# Patient Record
Sex: FEMALE | Race: WHITE | NOT HISPANIC OR LATINO | Employment: UNEMPLOYED | URBAN - METROPOLITAN AREA
[De-identification: names, ages, dates, MRNs, and addresses within clinical notes are randomized per-mention and may not be internally consistent; named-entity substitution may affect disease eponyms.]

---

## 2017-07-06 ENCOUNTER — TRANSFERRED RECORDS (OUTPATIENT)
Dept: HEALTH INFORMATION MANAGEMENT | Facility: CLINIC | Age: 32
End: 2017-07-06

## 2017-08-18 ENCOUNTER — MEDICAL CORRESPONDENCE (OUTPATIENT)
Dept: BEHAVIORAL HEALTH | Facility: CLINIC | Age: 32
End: 2017-08-18

## 2017-08-21 ENCOUNTER — HOSPITAL ENCOUNTER (OUTPATIENT)
Dept: BEHAVIORAL HEALTH | Facility: CLINIC | Age: 32
End: 2017-08-21
Attending: PSYCHIATRY & NEUROLOGY
Payer: MEDICAID

## 2017-08-21 VITALS
BODY MASS INDEX: 28.1 KG/M2 | WEIGHT: 158.6 LBS | DIASTOLIC BLOOD PRESSURE: 77 MMHG | HEART RATE: 100 BPM | SYSTOLIC BLOOD PRESSURE: 109 MMHG | HEIGHT: 63 IN | TEMPERATURE: 98 F

## 2017-08-21 PROCEDURE — H2035 A/D TX PROGRAM, PER HOUR: HCPCS | Mod: HQ

## 2017-08-21 PROCEDURE — 10020000 ZZH LODGING PLUS FACILITY CHARGE ADULT

## 2017-08-21 RX ORDER — LORATADINE 10 MG/1
10 TABLET ORAL DAILY PRN
COMMUNITY
End: 2017-09-16

## 2017-08-21 RX ORDER — MAGNESIUM HYDROXIDE/ALUMINUM HYDROXICE/SIMETHICONE 120; 1200; 1200 MG/30ML; MG/30ML; MG/30ML
30 SUSPENSION ORAL EVERY 6 HOURS PRN
COMMUNITY
End: 2017-09-16

## 2017-08-21 RX ORDER — AMOXICILLIN 250 MG
2 CAPSULE ORAL DAILY PRN
COMMUNITY
End: 2017-09-16

## 2017-08-21 ASSESSMENT — ANXIETY QUESTIONNAIRES
GAD7 TOTAL SCORE: 1
4. TROUBLE RELAXING: NOT AT ALL
5. BEING SO RESTLESS THAT IT IS HARD TO SIT STILL: NOT AT ALL
1. FEELING NERVOUS, ANXIOUS, OR ON EDGE: SEVERAL DAYS
6. BECOMING EASILY ANNOYED OR IRRITABLE: NOT AT ALL
2. NOT BEING ABLE TO STOP OR CONTROL WORRYING: NOT AT ALL
3. WORRYING TOO MUCH ABOUT DIFFERENT THINGS: NOT AT ALL
7. FEELING AFRAID AS IF SOMETHING AWFUL MIGHT HAPPEN: NOT AT ALL

## 2017-08-21 ASSESSMENT — PATIENT HEALTH QUESTIONNAIRE - PHQ9: SUM OF ALL RESPONSES TO PHQ QUESTIONS 1-9: 2

## 2017-08-21 ASSESSMENT — PAIN SCALES - GENERAL: PAINLEVEL: NO PAIN (0)

## 2017-08-21 NOTE — PROGRESS NOTES
08/21/17  Pt entered the [facility] unit this date.  She attended the pm group, introduced herself to peers and became active in the group process.  Pt was provided basic materials to include a treatment goal list and a Patient Safety Plan.  Pt appears open and willing for treatment at this time.  Pt to attend the LP orientation and continue program.
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
## 2017-08-21 NOTE — PROGRESS NOTES
Initial Services Plan        Before your first treatment group, please do the following    Immediate health & safety concerns: Go to the emergency room if you start to have withdrawal symptoms.  Look for sober housing and a supportive social network.  Look for a support network (such as AA, NA, DBT group, a Cheondoism group, etc.)    Suggestions for client during the time between intake & completion of treatment plan:  Tour your treatment center (unit or outpatient clinic).  Introduce yourself to the treatment group.  Spend time getting to know your peers.  Review your patient or client handbook.    Client issues to be addressed in the first treatment sessions:  Other Share with the group what motivated her for treatment i.e. CPS.       ANGEL Patel  8/21/2017  7:19 AM

## 2017-08-21 NOTE — PROGRESS NOTES
Name: Jayna oFx  Date: 8/21/2017  Medical Record: [id number]    Envelope Number: [id number]    List of Contents (List each item separately in new row):   Eye Drops ([employer])    Admission:  I am responsible for any personal items that are not sent to the safe or pharmacy.  [facility] is not responsible for loss, theft or damage of any property in my possession.      Patient Signature:  ___________________________________________       Date/Time:__________________________    Staff Signature: __________________________________       Date/Time:__________________________    2nd Staff person, if patient is unable/unwilling to sign:      __________________________________________________________       Date/Time: __________________________      Discharge:  [facility] has returned all of my personal belongings:    Patient Signature: ________________________________________     Date/Time: ____________________________________    Staff Signature: ______________________________________     Date/Time:_____________________________________

## 2017-08-21 NOTE — PROGRESS NOTES
"LP Admission Update    Date of update:     8/21/2017 Update Evaluation Counselor:      Franklin Paul [facility]     Date of original Rule 25:    7/13/2917    Original Evaluation Counselor:      [place] [phone]       PHQ-9  Score 10 or greater No  2   ABUNDIO-7  Score 10 or greater No  1   If the patient responded yes to any of the above questions except for *, then the patient would need to be scheduled for an initial mental health screen.    Suicide Screening Questions:     1. Are you feeling hopeless about the present/future? No   2. Have you ever had thoughts about taking your life? No   3. When did you have these thoughts? NA   4. Do you have any current intent or active desire to take your life? No   5. Do you have a plan to take your life?  No   6. Have you ever made a suicide attempt? No   7. Do you have access to pills, guns or other methods to kill yourself? No                  Risk Status - Use as Guide/Example     Ideation - Active  Thoughts of suicide Intent to follow  Through on suicide Plan for completing  suicide    Yes No Yes No Yes No   Emergent X   X   X     Urgent / Non-Emergent X   X     X   Non-Urgent X     X   X   No Current / Active Risk (Past 6 Months)   X   X   X   Jayna Fox No No No         Additional Risk Factors: Significant history of having untreated or poorly treated mental health symptoms  She is required to go into tx by CPS with the threat of dtr. Being taken away.    Protective Factors:  Her father is supportive.      Risk Status:     Emergent? No  Urgent / Non-Emergent?  No  Present / Non- Urgent? Yes, Document in Epic / [employer] to counselor, Collaborate with patient / client to develop \"Patient Safety Plan\", Address in Treatment Plan, Continuous monitoring, assessment and intervention and Address in Discharge / Transition Plan      No Current Risk? See above     Additional information to support suicide risk rating: See Above        Current EDIN:       .000   Current UA: "   Positive for amph and met and negative for all other screened drugs.     Alcohol/Drug use since last CD evaluation (include date and time of last use):   8/18/17 pt smoked unknown amt of crack at 9 pm     Please note any other clinical changes since the last CD evaluation (such as medication changes, additional legal charges, detoxification admissions, overdoses, etc):    No significant changes since the last CD evaluation       Current ASAM Dimensions      Intoxication and Withdrawal: 0  Biomedical:  0  Emotional and Behavioral:  2  Readiness to Change:  2  Relapse Potential: 4  Recovery Environmental:  3

## 2017-08-21 NOTE — PROGRESS NOTES
[facility] Nursing Health Assessment    Vital signs: BP - 109/77   Pulse - 100    Temp - 98    Direct admission    Counselor: Ethel Dunn  Drug of Choice: Methamphetamine   Last use: 8/18/2017  Home clinic/MD: [facility]  Patient reports having an H & P evaluation in the ED at [facility] about a month ago after Left ankle sprain. No H & P needed at this time.     Psychiatrist/therapist: None  Per patient she is court ordered to have a psych assessment during treatment. Appointment request made for patient to see Dr. Bar.     Medical history/current conditions: None    Mental Health diagnosis: None  Medication compliant?: N/A no medications  Recent sucidal thoughts? none    When? N/A  Current thought of self-harm? none    Plan? N/A  Pt. Self rating of impulsiveness? (1-10 scale): 4    Pain assessment:   Pt. Experiencing pain at this time? No  Rating on 0-10 scale: (1-10 scale): 0  Location: None  Result of: N/A  L P pain management strategy: OTC medications    On-going nursing intervention required?   No
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
## 2017-08-21 NOTE — PROGRESS NOTES
Name: Jayna Fox  Date: 8/21/2017  Medical Record: [id number]    Envelope Number: [id number]    List of Contents (List each item separately in new row):   Cellphone    Admission:  I am responsible for any personal items that are not sent to the safe or pharmacy.  [facility] is not responsible for loss, theft or damage of any property in my possession.      Patient Signature:  ___________________________________________       Date/Time:__________________________    Staff Signature: __________________________________       Date/Time:__________________________    2nd Staff person, if patient is unable/unwilling to sign:      __________________________________________________________       Date/Time: __________________________      Discharge:  [facility] has returned all of my personal belongings:    Patient Signature: ________________________________________     Date/Time: ____________________________________    Staff Signature: ______________________________________     Date/Time:_____________________________________

## 2017-08-22 ENCOUNTER — HOSPITAL ENCOUNTER (OUTPATIENT)
Dept: BEHAVIORAL HEALTH | Facility: CLINIC | Age: 32
End: 2017-08-22
Attending: FAMILY MEDICINE
Payer: MEDICAID

## 2017-08-22 PROCEDURE — 10020000 ZZH LODGING PLUS FACILITY CHARGE ADULT

## 2017-08-22 PROCEDURE — H2035 A/D TX PROGRAM, PER HOUR: HCPCS | Mod: HQ

## 2017-08-22 ASSESSMENT — ANXIETY QUESTIONNAIRES: GAD7 TOTAL SCORE: 1
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
## 2017-08-22 NOTE — PROGRESS NOTES
[address] [address]          Maliha Tejada  [facility]   Work cell: [phone]  Direct Line: [phone]  Fax [phone]    Dear Maliha,    This is to verify that Jayna Fox was admitted for treatment of chemical dependency at [place], [place] on 8/21/2017.    This individual is currently participating in:   ______ Inpatient   __x____ Lodging Plus (Residential Non-Medical)   ______ Day Outpatient   ______ Evening Outpatient       The client will participate in the program from three to four weeks, depending on the needs of the client.  Treatment is A.A. based and helps clients to achieve a chemically free lifestyle through lectures, individual, family and group therapy.  She has been assigned to Mehdi Telephone: [phone]  as her primary counselors.  If you have further questions, please contact us.    Respectfully,    ANGEL Patel [facility], [facility]  Licensed Psychotherapist Supervisor  [address] [place]. [address]  [email or web address]  Tel. [phone]  Fax. [phone]    Faxed 8/21/17

## 2017-08-22 NOTE — PROGRESS NOTES
[address] [address]          Girma Sandra  [facility]   Fax  [phone]    Dear Girma,    This is to verify that Jayna Fox was admitted for treatment of chemical dependency at [place], [place] on 8/21/2017.    This individual is currently participating in:   ______ Inpatient   __x____ Lodging Plus (Residential Non-Medical)   ______ Day Outpatient   ______ Evening Outpatient       The client will participate in the program from three to four weeks, depending on the needs of the client.  Treatment is A.A. based and helps clients to achieve a chemically free lifestyle through lectures, individual, family and group therapy.  She has been assigned to Mehdi  Telephone: [phone] as her primary counselor.  If you have further questions, please contact us.    Respectfully,    ANGEL Patel [facility], [facility]  Licensed Psychotherapist Supervisor  [address] [place]. [address]  [email or web address]  Tel. [phone]  Fax. [phone]    Fax  8/21/2017
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
## 2017-08-23 ENCOUNTER — HOSPITAL ENCOUNTER (OUTPATIENT)
Dept: BEHAVIORAL HEALTH | Facility: CLINIC | Age: 32
End: 2017-08-23
Attending: FAMILY MEDICINE
Payer: MEDICAID

## 2017-08-23 PROBLEM — F19.20 CHEMICAL DEPENDENCY (H): Status: ACTIVE | Noted: 2017-08-23

## 2017-08-23 PROCEDURE — 10020000 ZZH LODGING PLUS FACILITY CHARGE ADULT

## 2017-08-23 PROCEDURE — H2035 A/D TX PROGRAM, PER HOUR: HCPCS | Mod: HQ

## 2017-08-23 PROCEDURE — H2035 A/D TX PROGRAM, PER HOUR: HCPCS

## 2017-08-23 NOTE — PROGRESS NOTES
Name: Jayna Fox  Date: 8/23/2017  Medical Record: [id number]    Envelope Number: [id number]    List of Contents (List each item separately in new row):   Eye Drops ([employer])    Admission:  I am responsible for any personal items that are not sent to the safe or pharmacy.  [facility] is not responsible for loss, theft or damage of any property in my possession.      Patient Signature:  ___________________________________________       Date/Time:__________________________    Staff Signature: __________________________________       Date/Time:__________________________    2nd Staff person, if patient is unable/unwilling to sign:      __________________________________________________________       Date/Time: __________________________      Discharge:  [facility] has returned all of my personal belongings:    Patient Signature: ________________________________________     Date/Time: ____________________________________    Staff Signature: ______________________________________     Date/Time:_____________________________________

## 2017-08-23 NOTE — PROGRESS NOTES
Name: Jayna Fox  Date: 8/23/2017  Medical Record: [id number]    Envelope Number: [id number]    List of Contents (List each item separately in new row):   Cell Phone ([employer]TE)    Admission:  I am responsible for any personal items that are not sent to the safe or pharmacy.  [facility] is not responsible for loss, theft or damage of any property in my possession.      Patient Signature:  ___________________________________________       Date/Time:__________________________    Staff Signature: __________________________________       Date/Time:__________________________    2nd Staff person, if patient is unable/unwilling to sign:      __________________________________________________________       Date/Time: __________________________      Discharge:  [facility] has returned all of my personal belongings:    Patient Signature: ________________________________________     Date/Time: ____________________________________    Staff Signature: ______________________________________     Date/Time:_____________________________________

## 2017-08-23 NOTE — PROGRESS NOTES
"8/23/17  Pt and counselor met for discussion and development of treatment plan.  Pt reports feeling she does not have an addiction problem.  She reports she entered treatment due to her legal issues. Pt reports \"I am willing to do whatever I need to do to complete the program.\"  Pt had difficulty identifying issues to be addressed in treatment, but agreed to look at abandonment, grief/loss, emotional management and strengthening her sense of self.  Pt to follow treatment plan.  "

## 2017-08-23 NOTE — PROGRESS NOTES
Acknowledgement of Current Treatment Plan     1. I have reviewed my treatment plan with my therapist / counselor on 08/24/17. I agree with the plan as it is written in the electronic health record.    Name Signature   Jayna STACY Fox    Name of Therapist / Counselor    Shaun ORTIZ      2. I have completed and reviewed my Safety Plan with my counselor and signed this on                  . I have been given the hard copy of this plan.    Patient signature:  ________________________________________________________________________    Signatures required for any additional Problems, Goals, and/or Interventions added to treatment plan:    I have been given a copy of the addition to my treatment plan in Dimension _____ on [date           ] and I agree with this as it is written in the electronic record.     Patient signature:   ________________________________________________________________________    I have been given a copy of the addition to my treatment plan in Dimension _____ on [date           ] and I agree with this as it is written in the electronic record.      Patient signature:   ________________________________________________________________________    I have been given a copy of the addition to my treatment plan in Dimension _____ on [date          ] and I agree with this as it is written in the electronic record.     Patient signature:   ________________________________________________________________________    I have been given a copy of the addition to my treatment plan in Dim[facility] _____ on [date         ] and I agree with this as it is written in the electronic record.      Patient signature:   ________________________________________________________________________

## 2017-08-24 ENCOUNTER — HOSPITAL ENCOUNTER (OUTPATIENT)
Dept: BEHAVIORAL HEALTH | Facility: CLINIC | Age: 32
End: 2017-08-24
Attending: FAMILY MEDICINE
Payer: MEDICAID

## 2017-08-24 PROCEDURE — 10020000 ZZH LODGING PLUS FACILITY CHARGE ADULT

## 2017-08-24 PROCEDURE — H2035 A/D TX PROGRAM, PER HOUR: HCPCS | Mod: HQ

## 2017-08-25 ENCOUNTER — HOSPITAL ENCOUNTER (OUTPATIENT)
Dept: BEHAVIORAL HEALTH | Facility: CLINIC | Age: 32
End: 2017-08-25
Attending: FAMILY MEDICINE
Payer: MEDICAID

## 2017-08-25 PROCEDURE — H2035 A/D TX PROGRAM, PER HOUR: HCPCS | Mod: HQ

## 2017-08-25 PROCEDURE — 10020000 ZZH LODGING PLUS FACILITY CHARGE ADULT

## 2017-08-25 NOTE — PROGRESS NOTES
[address] [address]          Jayna Fox, 1985, was admitted for evaluation/treatment of chemical dependency at [facility].  This person took part in these program(s):    ______ The Inpatient Program   ______ The Outpatient Program   __X__ The Lodging Plus Program   ______ Lodging Day Outpatient       Date admitted: 8/21/17  Date discharged: 8/25/2017     Type of discharge:   ______ Satisfactory - completed evaluation / treatment   ______ Discharged without completing   ______ Behavioral discharge   ______ Transferred to another chemical dependency program   ______ Transferred to another type of service   __X___ Left against medical advice (AMA) / [facility]       Comments: Pt reported she wanted to leave and was not staying any longer. Pt given a cab voucher to her Dad's home.      Counselor: ANGEL Aponte                       Date: 8/25/2017             Time: 1:38 PM

## 2017-08-25 NOTE — PROGRESS NOTES
DISCHARGE:  Pt came to this counselor asking if she could get something for her anxiety. She was referred to the nurse and scheduled to see Dr Bar on Monday. Pt informed nurse she was going to leave. Pt came to see this counselor and confirmed she wanted to leave. She was given a cab voucher to her Dad's in [place] and reminded to  her meds and security belongings from the [facility] on unit.  .

## 2017-08-26 ENCOUNTER — HOSPITAL ENCOUNTER (OUTPATIENT)
Dept: BEHAVIORAL HEALTH | Facility: CLINIC | Age: 32
End: 2017-08-26
Attending: FAMILY MEDICINE
Payer: MEDICAID

## 2017-08-26 PROCEDURE — H2035 A/D TX PROGRAM, PER HOUR: HCPCS | Mod: HQ

## 2017-08-26 PROCEDURE — 10020000 ZZH LODGING PLUS FACILITY CHARGE ADULT

## 2017-08-27 ENCOUNTER — HOSPITAL ENCOUNTER (OUTPATIENT)
Dept: BEHAVIORAL HEALTH | Facility: CLINIC | Age: 32
End: 2017-08-27
Attending: FAMILY MEDICINE
Payer: MEDICAID

## 2017-08-27 PROCEDURE — H2035 A/D TX PROGRAM, PER HOUR: HCPCS | Mod: HQ

## 2017-08-27 PROCEDURE — 10020000 ZZH LODGING PLUS FACILITY CHARGE ADULT

## 2017-08-28 ENCOUNTER — HOSPITAL ENCOUNTER (OUTPATIENT)
Dept: BEHAVIORAL HEALTH | Facility: CLINIC | Age: 32
End: 2017-08-28
Attending: FAMILY MEDICINE
Payer: MEDICAID

## 2017-08-28 PROCEDURE — H2035 A/D TX PROGRAM, PER HOUR: HCPCS | Mod: HQ

## 2017-08-28 PROCEDURE — 10020000 ZZH LODGING PLUS FACILITY CHARGE ADULT

## 2017-08-28 NOTE — PROGRESS NOTES
"Patient:  Jayna Fox              Adult CD Progress Note and Treatment Plan Review     Attendance  Please refer to OP BEH CD Adult Attendance Record Documentation Flowsheet    Support group attended this week: yes    Reporting sobriety:  yes    Treatment Plan     Treatment Plan Review competed on:  08/29/17       Client preferred learning style: Visual  Hands on  Demonstration    Staff Members contributing  Ethel Lozano [facility], Randi Culver [facility], Shaun Billingsley [facility]                      Received Supervision: yes    Client: contributed to goals and plan.    Client received copy of plan/revised plan: Yes    Client agrees with plan/revised plan: Yes    Changes to Treatment Plan: No    New Goals added since last review No    Goals worked on since last review continuing stabilization, esteem building, relationships, relapse prevention, spirituality, grief/loss, 1.1 therapy, aftercare polanning    Strategies effective: yes    Strategies need these changes:  No      Dimension 1  0  Pt denies any symptoms of withdrawal at this time.  She appears full functioning.     Dimension 2  0  Pt is able to attend programming and access medical care as needed.         Dimension 3  2  Pt has had difficulty adjusting to the Lodging Plus unit.  She reported she was planning to leave the program on two occassions, but decided to remain.  Pt met with Dr Hue Bar and  a R/O of anxiety disorder, NOS was the result.  Pt reports feeling more energy and less stress this week.  Pt is meeting with therapist, Prisca Luna. Pt attended the grief/loss focus group facilitated by Florencio Lomas.  Pt has been encouraged to complete and share an assignment to aid her in building healthy esteem.   Pt's suicide rating resulted as \"no identified risk\" this week.  She denies any thoughts of suicide or self harm this week.    Dimension 4  2  Pt appears to continue to minimize her use.  She did disclose she has used daily for the past month. " She plans to present an assignment identifying consequences of her use.  Pt attends programming and participates in groups and exercises.  She reports being motivated by her desire to regain custody of her daughter.       Dimension 5   4  Pt rated her cravings at a 1, on scale of 1 to 10, ten being high.  Pt plans to attend relapse prevention workshops over the upcoming weekend.   Pt attended the spiritual care focus group, facilitated by Aleida Little, and attended by Shaun ORTIZ and Ethel ORTIZ.   Pt and counselor to consider options for ongoing care following Lodging Plus completion.      Dimension 6  4   Pt lacks a sober network of support.  Pt attends 12 step meetings on the unit and plans to continue to attend in her home community.  Pt invited her father to attend the family week program.   Pt reports she feels supported by her father and [facility] .  Pt reports her daughter will be transported by the [facility] worker to visit Pt over the upcoming weekend.     Any changes in Vulnerable Adult Status?  Yes  If yes, add to treatment plan and individual abuse prevention plan.    Family Involvement:   none schedule this week    Data:   client did participate    Intervention:   Aftercare planning  Counselor feedback  Education  Emotional management  Group feedback  Relapse prevention  Mental health education    Assessment:   Stages of Change Model  Contemplation    Appears/Sounds:  Cooperative  Engaged    Plan:  Focus on recovery environment  Monitor emotional/physical health      ANGEL Clifton

## 2017-08-29 ENCOUNTER — HOSPITAL ENCOUNTER (OUTPATIENT)
Dept: BEHAVIORAL HEALTH | Facility: CLINIC | Age: 32
End: 2017-08-29
Attending: FAMILY MEDICINE
Payer: MEDICAID

## 2017-08-29 PROCEDURE — 10020000 ZZH LODGING PLUS FACILITY CHARGE ADULT

## 2017-08-29 PROCEDURE — 90792 PSYCH DIAG EVAL W/MED SRVCS: CPT | Performed by: PSYCHIATRY & NEUROLOGY

## 2017-08-29 PROCEDURE — H2035 A/D TX PROGRAM, PER HOUR: HCPCS | Mod: HQ

## 2017-08-29 PROCEDURE — H2035 A/D TX PROGRAM, PER HOUR: HCPCS
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
## 2017-08-30 ENCOUNTER — HOSPITAL ENCOUNTER (OUTPATIENT)
Dept: BEHAVIORAL HEALTH | Facility: CLINIC | Age: 32
End: 2017-08-30
Attending: FAMILY MEDICINE
Payer: MEDICAID

## 2017-08-30 PROCEDURE — 10020000 ZZH LODGING PLUS FACILITY CHARGE ADULT

## 2017-08-30 PROCEDURE — H2035 A/D TX PROGRAM, PER HOUR: HCPCS | Mod: HQ

## 2017-08-30 NOTE — ADDENDUM NOTE
Encounter addended by: Shaun Billingsley [facility] on: 8/30/2017  7:50 AM<BR>     Actions taken: Sign clinical note

## 2017-08-30 NOTE — PROGRESS NOTES
"8/30/17  Pt shared an assignment re-framing negative self talk statements to positive and identifying some of her accomplishments, strengths and qualities.  Pt identified positives to include being strong, loyal, resilient, and positive.  Pt reports she struggles with vulnerability and tends to become avoidant.  Pt reports she is open and \"willing to learn.\"  Pt was provided feedback from peers and counselors.  Pt to continue program and practice positive affirmations daily.    "

## 2017-08-31 ENCOUNTER — HOSPITAL ENCOUNTER (OUTPATIENT)
Dept: BEHAVIORAL HEALTH | Facility: CLINIC | Age: 32
End: 2017-08-31
Attending: FAMILY MEDICINE
Payer: MEDICAID

## 2017-08-31 PROCEDURE — H2035 A/D TX PROGRAM, PER HOUR: HCPCS | Mod: HQ

## 2017-08-31 PROCEDURE — 10020000 ZZH LODGING PLUS FACILITY CHARGE ADULT

## 2017-09-01 ENCOUNTER — HOSPITAL ENCOUNTER (OUTPATIENT)
Dept: BEHAVIORAL HEALTH | Facility: CLINIC | Age: 32
End: 2017-09-01
Attending: FAMILY MEDICINE
Payer: COMMERCIAL

## 2017-09-01 PROCEDURE — H2035 A/D TX PROGRAM, PER HOUR: HCPCS | Mod: HQ

## 2017-09-01 PROCEDURE — 10020000 ZZH LODGING PLUS FACILITY CHARGE ADULT

## 2017-09-01 NOTE — PROGRESS NOTES
Name: Jayna Fox  Date: 9/1/2017  Medical Record: [id number]    Envelope Number: [id number]    List of Contents (List each item separately in new row):   Cell Phone (ZTE)    Admission:  I am responsible for any personal items that are not sent to the safe or pharmacy.  [facility] is not responsible for loss, theft or damage of any property in my possession.      Patient Signature:  ___________________________________________       Date/Time:__________________________    Staff Signature: __________________________________       Date/Time:__________________________    2nd Staff person, if patient is unable/unwilling to sign:      __________________________________________________________       Date/Time: __________________________      Discharge:  [facility] has returned all of my personal belongings:    Patient Signature: ________________________________________     Date/Time: ____________________________________    Staff Signature: ______________________________________     Date/Time:_____________________________________

## 2017-09-01 NOTE — PROGRESS NOTES
Name: Jayna Fox  Date: 9/1/2017  Medical Record: [id number]    Envelope Number: [id number]    List of Contents (List each item separately in new row):   Eye drops ([employer])    Admission:  I am responsible for any personal items that are not sent to the safe or pharmacy.  [facility] is not responsible for loss, theft or damage of any property in my possession.      Patient Signature:  ___________________________________________       Date/Time:__________________________    Staff Signature: __________________________________       Date/Time:__________________________    2nd Staff person, if patient is unable/unwilling to sign:      __________________________________________________________       Date/Time: __________________________      Discharge:  [facility] has returned all of my personal belongings:    Patient Signature: ________________________________________     Date/Time: ____________________________________    Staff Signature: ______________________________________     Date/Time:_____________________________________

## 2017-09-02 ENCOUNTER — HOSPITAL ENCOUNTER (OUTPATIENT)
Dept: BEHAVIORAL HEALTH | Facility: CLINIC | Age: 32
End: 2017-09-02
Attending: FAMILY MEDICINE
Payer: COMMERCIAL

## 2017-09-02 PROCEDURE — H2035 A/D TX PROGRAM, PER HOUR: HCPCS | Mod: HQ

## 2017-09-02 PROCEDURE — 10020000 ZZH LODGING PLUS FACILITY CHARGE ADULT

## 2017-09-03 ENCOUNTER — HOSPITAL ENCOUNTER (OUTPATIENT)
Dept: BEHAVIORAL HEALTH | Facility: CLINIC | Age: 32
End: 2017-09-03
Attending: FAMILY MEDICINE
Payer: COMMERCIAL

## 2017-09-03 PROCEDURE — 10020000 ZZH LODGING PLUS FACILITY CHARGE ADULT

## 2017-09-03 PROCEDURE — H2035 A/D TX PROGRAM, PER HOUR: HCPCS | Mod: HQ

## 2017-09-04 ENCOUNTER — HOSPITAL ENCOUNTER (OUTPATIENT)
Dept: BEHAVIORAL HEALTH | Facility: CLINIC | Age: 32
End: 2017-09-04
Attending: FAMILY MEDICINE
Payer: COMMERCIAL

## 2017-09-04 PROCEDURE — 10020000 ZZH LODGING PLUS FACILITY CHARGE ADULT

## 2017-09-04 PROCEDURE — H2035 A/D TX PROGRAM, PER HOUR: HCPCS | Mod: HQ

## 2017-09-04 NOTE — PROGRESS NOTES
"9/4/17  Pt shared assignments \"Developing a Sense of Self\" and \"My Qualities and Strengths.\"  She identified things she does well, compliments she has received, things she likes about her appearance and challenges she has overcome.  Pt recognizes her impulsive patterns and is working toward slowing down and taking responsibility.  Pt expressed her desire to be a good mother and to have a healthy relationship with her father.  Pt to continue to affirm herself and work toward changes to support her recovery.    "

## 2017-09-05 ENCOUNTER — TELEPHONE (OUTPATIENT)
Dept: BEHAVIORAL HEALTH | Facility: CLINIC | Age: 32
End: 2017-09-05

## 2017-09-05 ENCOUNTER — HOSPITAL ENCOUNTER (OUTPATIENT)
Dept: BEHAVIORAL HEALTH | Facility: CLINIC | Age: 32
End: 2017-09-05
Attending: FAMILY MEDICINE
Payer: COMMERCIAL

## 2017-09-05 PROCEDURE — H2035 A/D TX PROGRAM, PER HOUR: HCPCS

## 2017-09-05 PROCEDURE — H2035 A/D TX PROGRAM, PER HOUR: HCPCS | Mod: HQ

## 2017-09-05 PROCEDURE — 10020000 ZZH LODGING PLUS FACILITY CHARGE ADULT
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
## 2017-09-05 NOTE — TELEPHONE ENCOUNTER
A phone call was made as follow-up to the Family Program mailing for the week of 9/11/17. Message left or spoke in person to individuals on [facility].

## 2017-09-06 ENCOUNTER — HOSPITAL ENCOUNTER (OUTPATIENT)
Dept: BEHAVIORAL HEALTH | Facility: CLINIC | Age: 32
End: 2017-09-06
Attending: FAMILY MEDICINE
Payer: COMMERCIAL

## 2017-09-06 PROCEDURE — H2035 A/D TX PROGRAM, PER HOUR: HCPCS | Mod: HQ

## 2017-09-06 PROCEDURE — 10020000 ZZH LODGING PLUS FACILITY CHARGE ADULT

## 2017-09-06 NOTE — PROGRESS NOTES
Patient:  Jayna Fox            Adult CD Progress Note and Treatment Plan Review     Attendance  Please refer to OP BEH CD Adult Attendance Record Documentation Flowsheet    Support group attended this week: yes    Reporting sobriety:  yes    Treatment Plan     Treatment Plan Review competed on: 9/6/17       Client preferred learning style: Visual  Hands on  Demonstration    Staff Members contributing   Ethel Lozano [facility], Randi Culver [facility], Shaun Billingsley [facility]                Received Supervision: yes    Client: contributed to goals and plan.    Client received copy of plan/revised plan: Yes    Client agrees with plan/revised plan: Yes    Changes to Treatment Plan: No    New Goals added since last review  NA    Goals worked on since last review  Continuing stabilization, medication management, 1.1 therapy, spirituality, grief/loss, relapse prevention, emotional management, education about addiction and MICD interaction    Strategies effective: yes    Strategies need these changes: No    ASAM Risk Rating:    Dimension 1  0  Pt continues to appear full functioning.  She denies experiencing symptoms of withdrawal.    Dimension 2  0  Pt denies any chronic medical issues that would impair her ability to participate in programming. She is able to access medical care as needed.     Dimension 3  2  Pt presents as anxious, but brighter in demeanor.   Pt appears to be working to slow down, curb her impulsive tendencies and manage her emotions more effectively.  Pt reports feeling some stress and anxiety in relation to child protection and [place].  Pt shared assignments to aid her in strengthening her self concept identifying some of her positive qualities, strengths and accomplishments.  Pt reports she has a tendency to be stubborn, but follows redirection from staff very well.  Pt tends to seek validation from counselors often.  Pt denies any thoughts of suicide or self harm at this time.       Dimension 4  2   Although Pt's main motivation for treatment is to gain custody of her daughter, she appears to be gaining some internal motivation for sobriety.  Pt lacks insight and appears to lack an understanding of her addiction.   Pt has been encouraged to complete an assignment identifying consequences of her use.  Pt completed a checklist of behavioral, emotional and physical symptoms of chemical addiction identifying  10 of 60 symptoms.   Pt attends groups and lectures.     Dimension 5  4  Pt rated her cravings at a 4, on a scale of 1 to 10, ten being high.  She attended relapse prevention workshops to aid her in beginning to identifying triggers/warning signs and develop coping skills.  Pt identified coping skills to include breathing exercises, attending AA meetings, taking walks, speaking with peers and staff and reading daily meditations.  Pt completed a checklist of stress vulnerability which resulted with a low vulnerability.      Dimension 6  4  Pt reports her father plans to attend the family week program beginning 9/11/17.  Pt attends 12 step meetings on the unit.  Pt to continue to evaluate options for a safe sober living environment.    Any changes in Vulnerable Adult Status?  No  If yes, add to treatment plan and individual abuse prevention plan.    Family Involvement:   Scheduled family week    Data:   client did participate    Intervention:   Aftercare planning  Counselor feedback  Education  Emotional management  Group feedback  Relapse prevention  Client & counselor reviewed and signed ISP & assessment summary  Mental health education    Assessment:   Stages of Change Model   Contemplation    Appears/Sounds:  Cooperative  Engaged  Anxious    Plan:  Focus on recovery environment  Monitor emotional/physical health      Shaun Billingsley [facility]

## 2017-09-06 NOTE — PROGRESS NOTES
9/6/17   Voice messages left for Girma Yousif, [facility] , and Maliha Velasquez from [facility].

## 2017-09-07 ENCOUNTER — HOSPITAL ENCOUNTER (OUTPATIENT)
Dept: BEHAVIORAL HEALTH | Facility: CLINIC | Age: 32
End: 2017-09-07
Attending: FAMILY MEDICINE
Payer: COMMERCIAL

## 2017-09-07 PROCEDURE — 10020000 ZZH LODGING PLUS FACILITY CHARGE ADULT

## 2017-09-07 PROCEDURE — H2035 A/D TX PROGRAM, PER HOUR: HCPCS | Mod: HQ

## 2017-09-08 ENCOUNTER — HOSPITAL ENCOUNTER (OUTPATIENT)
Dept: BEHAVIORAL HEALTH | Facility: CLINIC | Age: 32
End: 2017-09-08
Attending: FAMILY MEDICINE
Payer: COMMERCIAL

## 2017-09-08 PROCEDURE — H2035 A/D TX PROGRAM, PER HOUR: HCPCS | Mod: HQ

## 2017-09-08 PROCEDURE — 10020000 ZZH LODGING PLUS FACILITY CHARGE ADULT

## 2017-09-09 ENCOUNTER — HOSPITAL ENCOUNTER (OUTPATIENT)
Dept: BEHAVIORAL HEALTH | Facility: CLINIC | Age: 32
End: 2017-09-09
Attending: FAMILY MEDICINE
Payer: COMMERCIAL

## 2017-09-09 PROCEDURE — H2035 A/D TX PROGRAM, PER HOUR: HCPCS | Mod: HQ

## 2017-09-09 PROCEDURE — 10020000 ZZH LODGING PLUS FACILITY CHARGE ADULT

## 2017-09-10 ENCOUNTER — HOSPITAL ENCOUNTER (OUTPATIENT)
Dept: BEHAVIORAL HEALTH | Facility: CLINIC | Age: 32
End: 2017-09-10
Attending: FAMILY MEDICINE
Payer: COMMERCIAL

## 2017-09-10 PROCEDURE — 10020000 ZZH LODGING PLUS FACILITY CHARGE ADULT

## 2017-09-10 PROCEDURE — H2035 A/D TX PROGRAM, PER HOUR: HCPCS | Mod: HQ

## 2017-09-11 ENCOUNTER — HOSPITAL ENCOUNTER (OUTPATIENT)
Dept: BEHAVIORAL HEALTH | Facility: CLINIC | Age: 32
End: 2017-09-11
Attending: FAMILY MEDICINE
Payer: COMMERCIAL

## 2017-09-11 PROCEDURE — H2035 A/D TX PROGRAM, PER HOUR: HCPCS | Mod: HQ

## 2017-09-11 PROCEDURE — 10020000 ZZH LODGING PLUS FACILITY CHARGE ADULT

## 2017-09-11 NOTE — PROGRESS NOTES
9/11/17   Pt shared an assignment focusing on consequences of her use identifying values violated to include family, parenting, honesty, consideration of others, friendships, communication and healthy relationships/  She identified consequences her daughter may have experienced due to her use. Emotional consequences for Pt's daughter and herself included pain, fear, abandonment, loneliness, sadness, disappointment, anger, distrust and embarrassed. Pt became tearful when sharing, especially in relation to her daughter.  Pt was given feedback and support.  Pt to continue program.

## 2017-09-12 ENCOUNTER — HOSPITAL ENCOUNTER (OUTPATIENT)
Dept: BEHAVIORAL HEALTH | Facility: CLINIC | Age: 32
End: 2017-09-12
Attending: FAMILY MEDICINE
Payer: COMMERCIAL

## 2017-09-12 PROCEDURE — H2035 A/D TX PROGRAM, PER HOUR: HCPCS | Mod: HQ

## 2017-09-12 PROCEDURE — H2035 A/D TX PROGRAM, PER HOUR: HCPCS

## 2017-09-12 PROCEDURE — 10020000 ZZH LODGING PLUS FACILITY CHARGE ADULT
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
## 2017-09-13 ENCOUNTER — HOSPITAL ENCOUNTER (OUTPATIENT)
Dept: BEHAVIORAL HEALTH | Facility: CLINIC | Age: 32
End: 2017-09-13
Attending: FAMILY MEDICINE
Payer: COMMERCIAL

## 2017-09-13 PROCEDURE — H2035 A/D TX PROGRAM, PER HOUR: HCPCS | Mod: HQ

## 2017-09-13 PROCEDURE — 10020000 ZZH LODGING PLUS FACILITY CHARGE ADULT

## 2017-09-13 NOTE — PROGRESS NOTES
Patient:  Jayna Fox                Adult CD Progress Note and Treatment Plan Review     Attendance  Please refer to OP BEH CD Adult Attendance Record Documentation Flowsheet    Support group attended this week: yes    Reporting sobriety:  yes    Treatment Plan     Treatment Plan Review competed on:   9/13/17       Client preferred learning style: Visual  Hands on  Demonstration    Staff Members contributing   Ethel Lozano [facility], Randi Culver [facility], Shaun Billingsley [facility]       Received Supervision: yes    Client: contributed to goals and plan.    Client received copy of plan/revised plan: Yes    Client agrees with plan/revised plan: Yes    Changes to Treatment Plan: No    New Goals added since last review No    Goals worked on since last review  Continuing stabilization, esteem building, relationships, relapse prevention, 1.1 therapy, consequences of use, grief/loss, aftercare planning    Strategies effective: yes    Strategies need these changes: NA    ASAM Risk Rating:    Dimension 1  0  Pt reports she is not experiencing any symptoms of withdrawal at this time.  She appears full functioning.    Dimension 2  0   Pt is able to attend all programming and access medical care if needed.    Dimension 3  2 Pt reports she is feeling less anxiety and stress as time progresses.  She appears to be gaining skills and working to manage her impulsiveness.  Pt is meeting with therapist, Prsica Luna. Pt plans to present an assignment on abandonment.  Pt attended the grief/loss focus group facilitated by Florencio MARTINEZ.    Pt denies any thoughts of suicide or self harm at this time.  Pt verbalizes her willingness to participate in DBT following her outpatient program.    Dimension 4  1  Pt is attending programming and participating in exercises.  She continues to strengthen her internal motivation for ongoing abstinence.  Pt presents a positive attitude and is supportive of her peers. She is respectful to staff.  Pt  appears open to learn and apply new tools for recovery. She reports she feels additional motivation from her son.    Dimension 5  4  Pt rated her cravings at a 2, on a scale of 1 to 10, ten being high.  Pt plans to attend relapse prevention workshops to aid her in becoming more aware of the nature of relapse and gaining insight into how to establish a life to be centered around healthy living.  Pt plans to enter [facility] outpatient program following [facility]    Dimension 6  4  Pt is attending 12 step meetings and spending time with female peers.  Pt attended relationship workshops and reports they were helpful in helping her understand aspects of a healthy relationship.  Pt plans to reside at the home of a sober friend following [facility] and she is seeking 12 step meetings in the community.    Any changes in Vulnerable Adult Status?  No  If yes, add to treatment plan and individual abuse prevention plan.    Family Involvement:   Father was unable to attend family week    Data:   client did participate    Intervention:   Aftercare planning  Counselor feedback  Education  Group feedback  Relapse prevention  Client & counselor reviewed and signed ISP & assessment summary  Mental health education    Assessment:   Stages of Change Model  Preparation/Determination    Appears/Sounds:  Cooperative  Engaged  Anxious  Motivated    Plan:  Focus on recovery environment  Monitor emotional/physical health    ANGEL Clifton

## 2017-09-14 ENCOUNTER — HOSPITAL ENCOUNTER (OUTPATIENT)
Dept: BEHAVIORAL HEALTH | Facility: CLINIC | Age: 32
End: 2017-09-14
Attending: FAMILY MEDICINE
Payer: COMMERCIAL

## 2017-09-14 PROCEDURE — H2035 A/D TX PROGRAM, PER HOUR: HCPCS | Mod: HQ

## 2017-09-14 PROCEDURE — 10020000 ZZH LODGING PLUS FACILITY CHARGE ADULT

## 2017-09-14 PROCEDURE — H2035 A/D TX PROGRAM, PER HOUR: HCPCS

## 2017-09-14 NOTE — PROGRESS NOTES
9/14/17  Counselor left  voice messages for Pt's , Aishwarya Vega ([phone]), and county , Girma Clancy provide discharge information.

## 2017-09-14 NOTE — PROGRESS NOTES
"INDIVIDUAL SESSION SUMMARY    D) Met with client on 9/14/17 from 8:30-9:00. Client reported feeling \"anxious\" about leaving [facility] on Monday and also \"ready\". Client reported having no contact with the BF she broke up with one month before entering treatment, and no fear that he will be persistant in trying to get in touch with her. Client spoke about her plan to stay busy next week moving out of her apartment, getting therapy set up and meeting with her . Client reported that she can walk to meetings and has been in touch with her mom who is 1 year sober and offered to drive her to meetings. Client reported feeling relieved that someone got in touch with her about getting a [facility] . Client spoke of the importance that she \"stay on schedule\" and look at her aftercare plan as her \"job\".    I) Individual session with client. Provided client with verbal interventions including: validation, nurturing, support.  A) Client appears to have accepted the legal consequences of her parental arrangement and is showing a willingness to do what is needed to remain in her daughters life. Client appears to be redirecting her stubbornness an dusing it as a strength to stay on task. Client appears to be taking steps to build a sober support network and create a new schedule. Client appears to have internal motivation but needs continuing reinforcement.    P) No future sessions scheduled. Client has arrangements for CD aftercare and to meet with a DBT therapist. This therapist printed off a daily planner for client to use to keep track of her appointments and ToDos.   Prisca Luna, ELEAZARFT  9/14/2017    "

## 2017-09-15 ENCOUNTER — HOSPITAL ENCOUNTER (OUTPATIENT)
Dept: BEHAVIORAL HEALTH | Facility: CLINIC | Age: 32
End: 2017-09-15
Attending: FAMILY MEDICINE
Payer: COMMERCIAL

## 2017-09-15 PROCEDURE — 10020000 ZZH LODGING PLUS FACILITY CHARGE ADULT

## 2017-09-15 PROCEDURE — H2035 A/D TX PROGRAM, PER HOUR: HCPCS | Mod: HQ
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
## 2017-09-16 ENCOUNTER — HOSPITAL ENCOUNTER (OUTPATIENT)
Dept: BEHAVIORAL HEALTH | Facility: CLINIC | Age: 32
End: 2017-09-16
Attending: FAMILY MEDICINE
Payer: COMMERCIAL

## 2017-09-16 PROCEDURE — H2035 A/D TX PROGRAM, PER HOUR: HCPCS | Mod: HQ

## 2017-09-16 PROCEDURE — 10020000 ZZH LODGING PLUS FACILITY CHARGE ADULT

## 2017-09-16 NOTE — PROGRESS NOTES
Nursing Discharge Planning Meeting    Writer completed discharge planning meeting with patient. Discharge is planned for Monday 9/18.    Discussed appropriate follow up care to manage YARITZA and general care and to obtain medication refills. Patient given a copy of their current medications for reference. Questions answered and patient verbalized understanding of post-discharge follow up plan.  Patient to schedule an appointment with their PCP at [facility].    Continue to support patient in discharge planning as needed to assure appropriate continuity of care.     Essential Oil Therapy  Patient used essential oil therapy during treatment program: No   If yes:   Was the essential oil therapy effective for managing sleep, pain, anxiety, or mood? NA &   Did the essential oil therapy a trigger for any cravings or urges to use drugs or alcohol? NA

## 2017-09-16 NOTE — IP AVS SNAPSHOT
"                  MRN:[id number]                      After Visit Summary   2017    Jayna Fox    MRN: [id number]           Visit Information        Provider Department      2017  7:15 AM ADULT LODGING PLUS E [facility]        Your next 10 appointments already scheduled     Sep 17, 2017  7:15 AM CDT   Treatment with ADULT LODGING PLUS E   [facility] ([facility])    [address] [id number] [phone]              MyChart Information     [employer] lets you send messages to your doctor, view your test results, renew your prescriptions, schedule appointments and more. To sign up, go to [email or web address] . Click on \"Log in\" on the left side of the screen, which will take you to the Welcome page. Then click on \"Sign up Now\" on the right side of the page.     You will be asked to enter the access code listed below, as well as some personal information. Please follow the directions to create your username and password.     Your access code is: [email or web address]  Expires: 2017  4:57 PM     Your access code will  in 90 days. If you need help or a new code, please call your [facility] clinic or [phone].        Care EveryWhere ID     This is your Care EveryWhere ID. This could be used by other organizations to access your [facility] medical records  [id number]        Equal Access to Services     [facility] : Hadii maria c masterson hadsisio Soomar, waaxda luqadaha, qaybta kaalmada adedarcieyada, walter long . So [facility] [phone].    ATENCIÓN: Si habla español, tiene a thoa disposición servicios gratuitos de asistencia lingüística. Llame al [phone].    We comply with applicable federal civil rights laws and [place] laws. We do not discriminate on the basis of race, color, national origin, age, disability sex, sexual orientation or gender identity.            "

## 2017-09-16 NOTE — IP AVS SNAPSHOT
Medication List       Patient:  SCOT FRIEDMAN   :  1985   Physician:  (Not on file)           This is your record.  Keep this with you and show to your community pharmacist(s) and physician(s) at each visit.     Allergies:  No Known Allergies          Medications  Valid as of: 2017 -  8:43 AM    Generic Name Brand Name Tablet Size Instructions for use    Melatonin   Take 3 mg by mouth nightly as needed        TraZODone HCl DESYREL 50 MG Take 1 tablet (50 mg) by mouth nightly as needed for sleep        .           .           .           .

## 2017-09-17 ENCOUNTER — HOSPITAL ENCOUNTER (OUTPATIENT)
Dept: BEHAVIORAL HEALTH | Facility: CLINIC | Age: 32
End: 2017-09-17
Attending: FAMILY MEDICINE
Payer: COMMERCIAL

## 2017-09-17 PROCEDURE — 10020000 ZZH LODGING PLUS FACILITY CHARGE ADULT

## 2017-09-17 PROCEDURE — H2035 A/D TX PROGRAM, PER HOUR: HCPCS | Mod: HQ

## 2017-09-18 NOTE — PROGRESS NOTES
CHEMICAL DEPENDENCY DISCHARGE SUMMARY      EVALUATION COUNSELOR:  Oni Ivan MD, Five \[facility] [place] [facility]\"" Recovery, updated by Franklin Paul, [facility], [facility].   TREATMENT COUNSELORS:  Shaun Billingsley [facility] and Ethel Lozano [facility].   REFERRAL SOURCE:  Self, with encouragement from Child Protection Services.   PROGRAM:  [facility] [place], Adult Chemical Dependency Lodging Plus Unit.   ADMISSION DATE:  08/21/2017.   LAST SESSION DATE:  09/17/2017.   DISCHARGE DATE:  09/18/2017.   ADMISSION DIAGNOSES:     1.  303.90, F10.20, alcohol use disorder, moderate.   2.  304.40, F15.20, amphetamine use disorder, moderate.   3.  305.10, F17.200, tobacco use disorder, moderate.   DISCHARGE DIAGNOSES:     1.  303.90, F10.20, alcohol use disorder, moderate.   2.  304.40, F15.20, amphetamine use disorder, moderate.   3.  305.10, F17.200, tobacco use disorder, moderate.   DISCHARGE STATUS:  The patient completed treatment plan goals and was discharged with counselor approval.   LAST USE DATE:  As identified by the patient, amphetamines 08/18/2017.  Tobacco:  She is a current tobacco user.   DAYS OF TREATMENT COMPLETED: 28     PRESENTING INFORMATION:  Jayna has an open Child Protection case. She completed a Rule 25 on 7/13/2017; which was sent to the courts and it was decided she need to follow the recommendations to complete residential chemical dependnecy treatment.     SERVICES PROVIDED:  Assessment, patient orientation, treatment planning, individual counseling, group therapy sessions, spiritual care counseling, lectures, workshops focusing on relapse prevention, relationships, other addictions, recovery topics and aftercare planning.      ISSUES ADDRESSED IN TREATMENT:   DIMENSION 1:  ACUTE INTOXICATION AND WITHDRAWAL POTENTIAL:  Initial risk factor 0.  Current risk factor 0.  The patient reports her last use of amphetamines as 08/18/2017.  The patient did not exhibit any signs or symptoms of intoxication.  Initially,  "she was having some withdrawal symptoms, sleeping and irritability, which dissipated within a few days.      DIMENSION 2:  BIOMEDICAL CONDITIONS AND COMPLAINTS:  Initial risk factor 0.  Current risk factor 0.  The patient was fully functioning and able to seek medical care as needed.      DIMENSION 3:  EMOTIONAL/BEHAVIORAL/COGNITIVE CONDITIONS AND COMPLICATIONS:  Initial risk factor 2.  Current risk factor 2.  The patient reports a history of depression and has been court ordered to seek evaluation for her mental health.  The patient was seen by staff psychiatrist, Dr. Bar, and she followed recommendations including taking medications as prescribed.  The patient was evaluated for a suicide risk rating, result present/nonurgent and she was monitored while in [facility].  The patient has lost her strong sense of self due to her use.  She began to strengthen her sense of self and develop self-love by completing the \"Book of Me,\" \"My Strengths and Qualities\" and \"Developing a Sense of Self\" assignments.  The patient wrote daily affirmations and a gratitude list.  The patient reports her mother is chemically dependent.  Her father left the family home and she has lacked nurturing from a caregiver.  Patient was given materials on abandonment and met with staff therapist, Prisca Luna, and will continue to see a therapist post [facility] near her home community.   Patient has unresolved grief and loss, especially in relation to her daughter being taken from her custody, a lack of parental nurturing and a lack of healthy support system.  The patient participated in the grief and loss focus group weekly to gain support and begin to heal. Risk factor remains the same, she will continue to have therapy and continue stabilization on medications.     DIMENSION 4:  READINESS TO CHANGE:  Initial risk factor 2.  Current risk factor 1.  The patient lacks insight into her chemical addiction and has consequences due to her " use.  She began gaining insight regarding addiction and the negative impact of her use and on herself by completing the consequences assignment, identifying values violated, emotional consequences, insane behaviors and 10 consequences her daughter suffered due to her use.  She completed the symptoms of chemical dependency check list.  Patient has continued to use despite consequences in major life areas, especially the removal of her daughter from her home.  She began to visualize what her life would look like in recovery by creating an individual collage about what her life would look like in 5 years sober versus 5 years using.  The patient's child was taken from the home and she continues with a child protection case.  Patient was to maintain contact with Girma Flores, [facility]; Maliha Osullivan, , [facility].  The patient did maintain contact with them and counselors also were in contact with Girma Flores.  Patient has unresolved legal issues in [place] and [facility].  She is working towards resolution by maintaining contact with  and following recommendations of the court.  The made progress in this dimension, gaining insight about addiciton and her desire to raise her daughter differently than she was raised..      DIMENSION 5:  RELAPSE, CONTINUED USE, CONTINUED PROBLEM POTENTIAL:  Initial risk factor 4.  Current risk factor 3.  The patient lacks insight into her personal relapse process, triggers and warning signs.  She lacks coping skills to avoid relapse.  She gained insight and began to develop coping skills to prevent relapse by attending the relapse prevention workshops and completing all activities.  She completed and shared a detailed relapse prevention packet and she is entering a step-down program at [facility] in [place], [place].  The patient lacks skills to manage her emotions and stress effectively.  She began working towards  development of emotional coping by identifying her feelings and emotions daily.  She read materials on stress and emotions and completed the hidden anger checklist.  The patient made progress in this dimension.      DIMENSION 6:  RECOVERY ENVIRONMENT:  Initial risk factor 4.  Current risk factor 3.  The patient has strained relationships with family due to her use.  She signed releases of information to invite them to attend the family program, they did not attend.  The patient is currently unemployed and she lacks a sober support network in recovery.  She began developing relationships with women in recovery by spending free time with female peers, attending at least two 12-step meetings weekly while in treatment and she found 12-step support groups in her home community.  She also listed 10 option of places she would like to seek employment.  The patient has been residing alone due to the removal of her daughter.  The patient was asked to consider options for safe sober supportive recovery and she has a sober friend of many years that offered to have her live in her home.      STRENGTHS:  As identified by the patient:  She is social.  She is a person of her word.  She does not like to waste her time.  The patient continued to grow up emotionally and gain insight while in treatment.  She was able to give kind supportive feedback to her peers and able to take constructive feedback from peers and staff.      PROGNOSIS:  Favorable if she follows all aftercare recommendations and referrals.      LIVING ARRANGEMENTS AT DISCHARGE:  Address is unknown.      CONTINUING CARE RECOMMENDATIONS AND REFERRALS:   1.  Abstain from all mood-altering substances except those prescribed if non-addictive.   2.  Attend at least two 12-step meetings weekly until entering [facility], then comply with their recommendations.   3.  Maintain regular contact with female sponsor.   4.  Enter aftercare at [facility] in [facility]  [place], until discharge with counselor approval.   5.  Comply with the rules and expectations of [facility].   6.  Monitor and comply with the advice of your doctor regarding mental and physical health.  Remain medication compliant.   7.  Continue investment in building sober support network in recovery.   8.  Continue monitoring and understanding of relapse triggers and stressors through the use and development of healthy coping skills.   9.  Continue with one-on-one therapy and family therapy with her daughter.     10.  Seek DBT counseling after completion of aftercare.   11.  Complete all legal requirements to resolve issues.      CC: Patricia    [facility] ,   [phone]      [facility],    [phone]      [facility],    [phone]      Girma Flores,    Child Protection Worker,    [facility],    [phone]      [facility],    Attention:  Jessie,    [phone]         This information has been disclosed to you from records protected by Federal confidentiality rules (42 CFR part 2). The Federal rules prohibit you from making any further disclosure of this information unless further disclosure is expressly permitted by the written consent of the person to whom it pertains or as otherwise permitted by 42 CFR part 2. A general authorization for the release of medical or other information is NOT sufficient for this purpose. The Federal rules restrict any use of the information to criminally investigate or prosecute any alcohol or drug abuse patient.      SHAMA MONROY, [facility]             D: 2017 12:15   T: 2017 13:16   MT: SK      Name:     SCOT FRIEDMAN   MRN:      [id number]        Account:      [id number]   :      1985           Visit Date:   2017      Document: [id number]

## 2017-09-18 NOTE — PROGRESS NOTES
[facility] Discharge Summary/Instructions     Patient: Jayna Fox  MRN: [id number]   : 1985 Age: 32 year old Sex: female   -  Focus of Treatment / Discharge Recommendations    Personal Safety/ Management of Symptoms  * Follow your safety plan.  Report increased symptoms to your care team and /or go to the nearest Emergency Department.  * Call crisis lines as needed  [facility] [phone]                [facility] [phone]  [facility] [phone]              Crisis Connection [phone]  [facility] [phone]              [facility] [phone]  [facility] [phone]          National Suicide Prevention [phone]  [facility] [phone]            Suicide Prevention [phone]  [facility] [phone]    Abstinence/Relapse Prevention  * Take all medicines as directed.  Carry a current list of medicines with you.  * Use coping skills: nutrition, rest, exercise, spirituality, journal, meditation, engage in activities you enjoy, seek sober support  * Do not use illicit (street) drugs, controlled substances (narcotics) or alcohol.    Develop/Improve Independent Living/Socialization Skills: ensure living environment is conducive to recovery    Community Resources/Supports and Discharge Planning:  Maintain abstinence from all mood altering substances, attend 2+ 12 step meetings per week, maintain contact with a program sponsor, enter the outpatient program at [facility] and follow recommendations, continue 1.1 therapy, DBT is recommended following outpatient programming, maintain contact with CPS and follow all recommendations of the courts, maintain contact with [facility] , Aishwarya Vega, and follow court recommendations, maintain good physical and mental health care.  Follow up with psychiatrist / main caregiver: PCP [facility]     Next visit: TBD  Follow up with your therapist:  To meet with DBT therapist     Next visit: TBD    Go to group therapy and / or support groups at: outpatient program and in home community    See your medical doctor about:  Ongoing physical health care    Client Signature:_______________________   Date / Time:___________  Staff Signature:________________________   Date / Time:___________

## 2017-09-18 NOTE — PROGRESS NOTES
[address] [place] [address]          Jayna Fox, 1985, was admitted for evaluation/treatment of chemical dependency at [facility].  This person took part in these program(s):    ______ The Inpatient Program   ______ The Outpatient Program   ___x__ The Lodging Plus Program   ______ Lodging Day Outpatient       Date admitted: 08/21/17  Date discharged: 09/18/17    Type of discharge:   ___x__ Satisfactory - completed evaluation / treatment   ______ Discharged without completing   ______ Behavioral discharge   ______ Transferred to another chemical dependency program   ______ Transferred to another type of service   ______ Left against medical advice (AMA) / [place]       Comments: [facility]      Counselor: ANGEL Clifton                       Date: 9/18/2017             Time: 7:35 AM

## 2022-08-03 ENCOUNTER — TELEPHONE (OUTPATIENT)
Dept: BEHAVIORAL HEALTH | Facility: CLINIC | Age: 37
End: 2022-08-03

## 2022-08-04 ASSESSMENT — ANXIETY QUESTIONNAIRES
5. BEING SO RESTLESS THAT IT IS HARD TO SIT STILL: NOT AT ALL
6. BECOMING EASILY ANNOYED OR IRRITABLE: SEVERAL DAYS
GAD7 TOTAL SCORE: 4
4. TROUBLE RELAXING: SEVERAL DAYS
7. FEELING AFRAID AS IF SOMETHING AWFUL MIGHT HAPPEN: NOT AT ALL
3. WORRYING TOO MUCH ABOUT DIFFERENT THINGS: SEVERAL DAYS
7. FEELING AFRAID AS IF SOMETHING AWFUL MIGHT HAPPEN: NOT AT ALL
GAD7 TOTAL SCORE: 4
1. FEELING NERVOUS, ANXIOUS, OR ON EDGE: SEVERAL DAYS
GAD7 TOTAL SCORE: 4
IF YOU CHECKED OFF ANY PROBLEMS ON THIS QUESTIONNAIRE, HOW DIFFICULT HAVE THESE PROBLEMS MADE IT FOR YOU TO DO YOUR WORK, TAKE CARE OF THINGS AT HOME, OR GET ALONG WITH OTHER PEOPLE: SOMEWHAT DIFFICULT
2. NOT BEING ABLE TO STOP OR CONTROL WORRYING: NOT AT ALL
8. IF YOU CHECKED OFF ANY PROBLEMS, HOW DIFFICULT HAVE THESE MADE IT FOR YOU TO DO YOUR WORK, TAKE CARE OF THINGS AT HOME, OR GET ALONG WITH OTHER PEOPLE?: SOMEWHAT DIFFICULT

## 2022-08-04 ASSESSMENT — PATIENT HEALTH QUESTIONNAIRE - PHQ9
SUM OF ALL RESPONSES TO PHQ QUESTIONS 1-9: 5
10. IF YOU CHECKED OFF ANY PROBLEMS, HOW DIFFICULT HAVE THESE PROBLEMS MADE IT FOR YOU TO DO YOUR WORK, TAKE CARE OF THINGS AT HOME, OR GET ALONG WITH OTHER PEOPLE: SOMEWHAT DIFFICULT
SUM OF ALL RESPONSES TO PHQ QUESTIONS 1-9: 5

## 2022-08-05 ENCOUNTER — HOSPITAL ENCOUNTER (OUTPATIENT)
Dept: BEHAVIORAL HEALTH | Facility: CLINIC | Age: 37
Discharge: HOME | End: 2022-08-05
Admitting: FAMILY MEDICINE
Payer: COMMERCIAL

## 2022-08-05 ENCOUNTER — TELEPHONE (OUTPATIENT)
Dept: BEHAVIORAL HEALTH | Facility: CLINIC | Age: 37
End: 2022-08-05

## 2022-08-05 VITALS — HEIGHT: 63 IN | BODY MASS INDEX: 31.89 KG/M2 | WEIGHT: 180 LBS

## 2022-08-05 PROCEDURE — H0001 ALCOHOL AND/OR DRUG ASSESS: HCPCS | Mod: GT,95 | Performed by: COUNSELOR

## 2022-08-05 ASSESSMENT — COLUMBIA-SUICIDE SEVERITY RATING SCALE - C-SSRS
TOTAL  NUMBER OF ABORTED OR SELF INTERRUPTED ATTEMPTS LIFETIME: NO
6. HAVE YOU EVER DONE ANYTHING, STARTED TO DO ANYTHING, OR PREPARED TO DO ANYTHING TO END YOUR LIFE?: NO
TOTAL  NUMBER OF INTERRUPTED ATTEMPTS LIFETIME: NO
1. HAVE YOU WISHED YOU WERE DEAD OR WISHED YOU COULD GO TO SLEEP AND NOT WAKE UP?: NO
2. HAVE YOU ACTUALLY HAD ANY THOUGHTS OF KILLING YOURSELF?: NO
ATTEMPT LIFETIME: NO

## 2022-08-05 ASSESSMENT — PAIN SCALES - GENERAL: PAINLEVEL: NO PAIN (0)
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
## 2022-08-05 NOTE — TELEPHONE ENCOUNTER
Writer contacted pt to inform her she is on the LP wait list pending insurance verification. She informed writer that her [employer] insurance is under her  name of Brandy, not her maiden name of Dominique. Writer will inform UR department of this to verify insurance.

## 2022-08-05 NOTE — TELEPHONE ENCOUNTER
Tried reaching out to patient to check them in for their virtual MH eval today, 8/5/2022 at 0800. Called both their listed home and mobile number, but no answer so a voice message was left for patient to return call to check in.

## 2022-08-05 NOTE — TELEPHONE ENCOUNTER
8/5/2022  Pt completed her YARITZA CA today. Pt is being referred to LP. She would like to enter LP around 8/11/2022.    DAANES Assessment ID: [id number]    LP SCREEN TELEPHONE NOTE   Jayna Fox paperwork was reviewed by ANGEL Velasquez and the patient was deemed ELIGIBLE for the LP program.     Inpatient or Community Referral: Community referral     Medical Screening (As Needed): The patient is medically stable and did not appear to need a medical screening with the LP RN at this time.     Regular use of benzodiazapine's (other than detox): The patient does NOT use benzodiazepines.     Insurance: The [facility] Department is responsible for obtaining ALL authorizations for treatment services at the EOP, DOP and LP levels of care.      This will be a: Seen by a [facility] Evaluation Counselor: OSIEL, ISP & LP UPDATE NOTE evaluation. (Update SBAR and route DAANES and ANABEL's)     IV use or Pregnant: This patient is not pregnant or an IV drug user.     Business office: [phone]     List: This patient CAN be placed on the FACE to FACE LP Waiting List at this time.       Group: Women's Group or Mixed Group     Additional Info as needed: NA     The best current contact telephone number for the patient is: [phone]       Thanks,  ANGEL Velasquez   8/5/2022

## 2022-08-09 NOTE — TELEPHONE ENCOUNTER
Writer contacted pt and left another VM detailing she needs to contact [facility] [place]'s Registration department to change her last name on the medical records. Writer gave pt the phone number to the registration department and also left a call back number.

## 2022-08-11 NOTE — TELEPHONE ENCOUNTER
Writer contacted pt to offer LP bed. Pt accepted for 8/16 at 10 am. The following was discussed with pt:      Come to [address] [place] [address] and go into the [facility], next to ED. Ask for [facility] at the Security Desk.    You must be on time for your scheduled appointment, if you are running late you must call the  at [phone]    You must remain sober from ALL substances including marijuana for 24 hours prior to admission otherwise you will not be allowed to admit into [facility]     A urine drug screen and breathalyzer will be required when you arrive on campus, please do not use the restroom until testing has been completed     You will be given a test for COVID19 and will need to quarantine in your room until negative results are received, if your results are positive you will be discharged immediately     Please limit luggage to one suitcase as you will be sharing a room and space is limited     Bring 30 days of medications     You can bring a cell phone for twice weekly phone visiting; it will be locked up the rest of the time. You will have access to a land line whenever you are not in group.    Due to hospital policy, patients are required to wear masks the entire time they are here.    No off-campus appointments; only virtual appointments for court or medical.

## 2022-08-16 NOTE — TELEPHONE ENCOUNTER
Pt called in and stated she overslept on her way back from [place] to drop off her child. Rescheduled pt for Thursday, Aug. 18 at 10 am. Pt is aware this is the last reschedule we can provide for pt.

## 2022-08-16 NOTE — TELEPHONE ENCOUNTER
Writer contacted pt to see if she is on her way for her 10:00 am appointment. Left VM with call back number.

## 2022-08-18 ENCOUNTER — HOSPITAL ENCOUNTER (OUTPATIENT)
Dept: BEHAVIORAL HEALTH | Facility: CLINIC | Age: 37
Discharge: HOME | End: 2022-08-18
Attending: FAMILY MEDICINE
Payer: COMMERCIAL

## 2022-08-18 VITALS
DIASTOLIC BLOOD PRESSURE: 103 MMHG | WEIGHT: 170 LBS | HEART RATE: 117 BPM | BODY MASS INDEX: 30.12 KG/M2 | SYSTOLIC BLOOD PRESSURE: 142 MMHG | OXYGEN SATURATION: 99 % | RESPIRATION RATE: 16 BRPM | HEIGHT: 63 IN | TEMPERATURE: 98.4 F

## 2022-08-18 LAB — SARS-COV-2 RNA RESP QL NAA+PROBE: NEGATIVE

## 2022-08-18 PROCEDURE — 1002N00001 HC LODGING PLUS FACILITY CHARGE ADULT

## 2022-08-18 PROCEDURE — U0005 INFEC AGEN DETEC AMPLI PROBE: HCPCS | Performed by: FAMILY MEDICINE

## 2022-08-18 PROCEDURE — H2035 A/D TX PROGRAM, PER HOUR: HCPCS | Mod: HQ

## 2022-08-18 RX ORDER — AMOXICILLIN 250 MG
2 CAPSULE ORAL DAILY PRN
COMMUNITY
End: 2024-06-28

## 2022-08-18 RX ORDER — IBUPROFEN 200 MG
400 TABLET ORAL EVERY 6 HOURS PRN
COMMUNITY
End: 2024-05-30

## 2022-08-18 RX ORDER — MAGNESIUM HYDROXIDE/ALUMINUM HYDROXICE/SIMETHICONE 120; 1200; 1200 MG/30ML; MG/30ML; MG/30ML
30 SUSPENSION ORAL EVERY 6 HOURS PRN
COMMUNITY
End: 2024-05-30

## 2022-08-18 RX ORDER — LANOLIN ALCOHOL/MO/W.PET/CERES
3 CREAM (GRAM) TOPICAL
COMMUNITY
End: 2024-05-30

## 2022-08-18 RX ORDER — LORATADINE 10 MG/1
10 TABLET ORAL DAILY PRN
COMMUNITY
End: 2024-05-30

## 2022-08-18 RX ORDER — ACETAMINOPHEN 325 MG/1
500 TABLET ORAL EVERY 4 HOURS PRN
COMMUNITY
End: 2024-05-30

## 2022-08-18 ASSESSMENT — COLUMBIA-SUICIDE SEVERITY RATING SCALE - C-SSRS
1. HAVE YOU WISHED YOU WERE DEAD OR WISHED YOU COULD GO TO SLEEP AND NOT WAKE UP?: NO
6. HAVE YOU EVER DONE ANYTHING, STARTED TO DO ANYTHING, OR PREPARED TO DO ANYTHING TO END YOUR LIFE?: NO
2. HAVE YOU ACTUALLY HAD ANY THOUGHTS OF KILLING YOURSELF?: NO
TOTAL  NUMBER OF ABORTED OR SELF INTERRUPTED ATTEMPTS LIFETIME: NO
ATTEMPT LIFETIME: NO
TOTAL  NUMBER OF INTERRUPTED ATTEMPTS LIFETIME: NO

## 2022-08-18 ASSESSMENT — ANXIETY QUESTIONNAIRES
3. WORRYING TOO MUCH ABOUT DIFFERENT THINGS: SEVERAL DAYS
4. TROUBLE RELAXING: SEVERAL DAYS
IF YOU CHECKED OFF ANY PROBLEMS ON THIS QUESTIONNAIRE, HOW DIFFICULT HAVE THESE PROBLEMS MADE IT FOR YOU TO DO YOUR WORK, TAKE CARE OF THINGS AT HOME, OR GET ALONG WITH OTHER PEOPLE: NOT DIFFICULT AT ALL
7. FEELING AFRAID AS IF SOMETHING AWFUL MIGHT HAPPEN: NOT AT ALL
1. FEELING NERVOUS, ANXIOUS, OR ON EDGE: SEVERAL DAYS
5. BEING SO RESTLESS THAT IT IS HARD TO SIT STILL: SEVERAL DAYS
GAD7 TOTAL SCORE: 5
2. NOT BEING ABLE TO STOP OR CONTROL WORRYING: NOT AT ALL
6. BECOMING EASILY ANNOYED OR IRRITABLE: SEVERAL DAYS
GAD7 TOTAL SCORE: 5

## 2022-08-18 ASSESSMENT — PATIENT HEALTH QUESTIONNAIRE - PHQ9: SUM OF ALL RESPONSES TO PHQ QUESTIONS 1-9: 6

## 2022-08-18 NOTE — PROGRESS NOTES
"[facility] Health Assessment      Vital signs:     BP (!) 142/103 (BP Location: Right arm, Patient Position: Sitting)   Pulse 110-117   Temp 98.4  F (36.9  C) (Temporal)   Resp 16   Ht 1.6 m (5' 3\")   Wt 77.1 kg (170 lb)   SpO2 99%   BMI 30.11 kg/m        Direct admission    Counselor: Jane  Drug of Choice: Methamphetamines   Last use: 11:00pm on 8/17/22  Home clinic/MD:     Department of Family Medicine in [place], [place]?      [address]?      [place] [address]?      [phone]? Chris Perez D.O.?      [address]?      [place] [address]?      [phone] (Work)?      [phone] (Fax)?      Last seen 2019 has phone appt 8/30 10:45     Psychiatrist/therapist: None    Medical history/current conditions: Pt reports 2 caesarian births and gallbladder removed in 2009. Denies any other medical hx.    H&P Screen:  H&P within the last 90 days: No.  Patient has been informed they are required to obtain an H&P within the next 14 days.        Mental Health diagnosis: anxiety and depression  Medication compliant?: Yes  Recent sucidal thoughts? None   When? N/A  Current thought of self-harm? None   Plan? N/A    Pain assessment:   Pt. Experiencing pain at this time?  No    LP Community Medical Screen for COVID-19    ______________________________________________________________________________________________________________________  Do you have any of the following NEW or worsening symptoms NOT attributed to pre-existing conditions?    No    o Fever of 100.0  F (37.8 C) or over  o Chills  o Cough  o Shortness of Breath  o Loss of taste or smell  o Generalized body aches  o Persistent headache  o Sore throat (or trouble eating or drinking in young children?)  o Nausea, Vomiting, or diarrhea (loose stools)    Did you test positive for COVID-19 in the last 10 days or are you waiting on the test results due to an exposure or symptoms?  No    Has anyone told you " to self-quarantine due to exposure to someone with COVID-19?  No    If pt responds   YES to any of the symptoms or  Positive COVID-19 result in the last 10 days with or without symptoms or YES to symptoms with pending results ptwill need to leave unit immediately and can return in 11 days from discharge date.    ______________________________________________________________________________________________________________________  If you are admitting directly from the community you will be required to stay in your room until COVID lab results confirm negative. If COVID results are positive, You will have to exit the LP program, quarantine as recommended per CDC and then may return for CD treatment after symptoms have resolved.  What is your exit plan should you be positive for COVID today or anytime during your stay? Return home. Pt would     COVID-19 Test completed by LPRN ? Yes    COVID-19 - Pt informed of the following while at :    1) Practice good hand washing hygiene and avoid touching face    2) If pt has any of the symptoms below, notify staff immediately.      Fever     Cough     Shortness of breath or difficulty breathing     Chills     Repeated shaking with chills    Muscle pain     3) COVID-19 testing may be initiated more than once during your stay.  If COVID results are at anytime positive, the pt will follow exit plan as listed above,  quarantine as recommended per CDC and then may return for CD treatment after symptoms have resolved.     4) Per COVID protocol, during your stay at , social distancing is required AND mouth and nose must be covered at all times with facial mask while out in milieu.      5) Patients will not be allowed to go to any outside appointments, all outside appointments will need to be virtual or by phone    RN Assessment of Patient's Ability to Safely Manage and Self-Administer Respiratory Treatments    Has experience in the management of Respiratory (If NA, indicate and  move to Integrative Therapies): N/A      Integrative Therapies: Essential Oils    Patient requesting essential oil inhaler to manage (Mood/Mental Health/Physical/Spiritual symptoms).     Discussed appropriate use of essential oil inhalers and instructed patient not to leave labeled product out on unit.     Patient was screened for kidney disease, asthma/reactive airway disease and rashes and wounds or 1st trimester of pregnancy    List Essential Oils requested by pt: none    Patient verbalized and demonstrated understanding of how to use essential oil inhaler correctly and will notify LP RN with any concerns or side effects. Patient agrees not to share their essential oil inhaler with other clients.  Continue to support the patient in safely utilizing integrative therapies as able to manage symptoms during treatment.     Patient tobacco use:    Do you use tobacco? yes   Type? Cigarette   How often? daily  How much? 0.5 ppd   Are you interested in quitting? no    NRT (Nicotine Replacement therapy) ordered? refused   Pt is aware of the dangers of tobacco cessation and in contemplation.    Pt given written education.    Nutritional Assessment:    Have you ever purged, binged or restricted yourself as a way to control your weight?   No     Are you on a special diet?   No     Do you have any concerns regarding your nutritional status?   No     Have you had any appetite changes in the last 3 months?   Reports low appetite r/t to stress. Pt also reports she is able to eat adequately and does not feel this is a serious medical concern at this time. Pt is encouraged to report any concern to LP RN especially if this changes or worsens.   Have you had weight loss or weight gain of more than 10 lbs in the last 3 months?   If patient gained or lost more than 10 lbs, then refer to program RN / attending Physician for assessment.   No   Was the patient informed of BMI?    Normal, No Intervention   No   Have you engaged in any  risk-taking behavior that would put you at risk for exposure to blood-borne or sexually transmitted diseases?   No   Do you have any dental problems?   No         Nursing Assessment Summary: As above. BP and Pulse slightly elevated at time of admission. Pt reports high levels of anxiety and denies any cardiac issues or hx of HTN. Also, pt exhibits rapid speech and labile mood during admission. Alerted counselor of this and the behavior was addressed further. Policies and expectations were reviewed with pt and she denies any substance use in the past 24 hours.     Pt requesting appointment for addiction medicine. RUBENS RN facilitating this. Also, RUBENS RN assisted pt with setting up a virtual PCP appointment as she needs an H&P.       On-going nursing intervention required?   No    Acute care visit recommended: no.    Lani Chaparro, TRACIE
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
## 2022-08-18 NOTE — PROGRESS NOTES
Name: Jayna Fox  Date: 8/18/2022  Medical Record: [id number]  Envelope Number: [id number]  List of Contents (List each item separately in new row):     [place] Phone (broken screen) &  set     Admission:  I am responsible for any personal items that are not sent to the safe or pharmacy.  [facility] is not responsible for loss, theft or damage of any property in my possession.    Patient Signature:  ___________________________________________       Date/Time:__________________________    Staff Signature: __________________________________       Date/Time:__________________________    2nd Staff person, if patient is unable/unwilling to sign:    __________________________________________________________       Date/Time: __________________________    Discharge:  [facility] has returned all of my personal belongings:    Patient Signature: ________________________________________     Date/Time: ____________________________________    Staff Signature: ______________________________________     Date/Time:_____________________________________

## 2022-08-18 NOTE — GROUP NOTE
Psychoeducation Group Documentation    PATIENT'S NAME: Jayna Fox  MRN:   [id number]  :   1985  ACCT. NUMBER: [id number]  DATE OF SERVICE: 22  START TIME:  3:00 PM  END TIME:  4:00 PM  FACILITATOR(S): Marcos Hdz LADC; Gerda Cm LADC; Jody Daniel LADC  TOPIC: BEH Pyschoeducation  Number of patients attending the group:  5  Group Length:  1 Hours    Skills Group Therapy Type: Healthy behaviors development    Summary of Group / Topics Discussed:    Balanced lifestyle skills          Group Attendance:  Attended group session    Patient's response to the group topic/interactions:  cooperative with task    Patient appeared to be Attentive.         Client specific details:  The patient participated in the afternoon skills group on addiction.

## 2022-08-18 NOTE — PROGRESS NOTES
"  Progress Note    This patient had a Comprehensive Substance Abuse assessment on 8-5-22 completed by Lorrie Daigle MA, ANGEL .  This patient was seen for a face to face update of the Comprehensive Substance Abuse assessment on 8/18/2022  by ANGEL Rabago.  INSIDE: The patient's Comprehensive Substance Abuse assessment completed on 8-5-22 is in the patient's electronic medical record in Epic in the Chart Review section under the Notes/Trans Tab.    Alcohol/Drug use since the last CD evaluation (include date of last use):     Pt's l/u of meth was approx 11:00 8-17-22. Pt denies any withdrawal symptoms but states \"I know they're coming.\"     Please note any other clinical changes since the last CD evaluation (such as medication changes, additional legal charges, detoxification admissions, overdoses, etc.)     No significant changes since the last CD evaluation        ASAM Dimensions Original scores Current Scores   I.) Intoxication and Withdrawal: 1 0   II.) Biomedical:  0 0   III.) Emotional and Behavioral:  2 2   IV.) Readiness to Change:  0 0   V.) Relapse Potential: 4 4   VI.) Recovery Environmental: 3 3     Please list clinical justifications for the above ASAM score changes since the original comprehensive assessment:     The patient's score on Dimension I changed from a 1 to a 0.  The patient had not consumed any alcohol or drugs since 11:00 8-17-22 and she does not appear to have any current risk of having significant withdrawal symptoms.        Current EDIN: Current UA:     0.000     Positive for Amphetamines, Methamphetamine and MDMA and negative for all other screened drugs.       PHQ-9, ABUNDIO-7   PHQ-9 on 8/18/2022  ABUNDIO-7 on 5/18/2021 8/18/2022    The patient's PHQ-9 score was 6 out of 27, indicating mild depression. The patient's ABUNDIO-7 score was 5 out of 21, indicating mild anxiety.       Additional Risk Factors:    Someone close to the patient (family member/friend) completed a suicide     " "Significant history of having untreated or poorly treated mental health symptoms     Tendency to be socially isolated and/or cut off from the support of others     A recent death of someone close to the patient and/or unresolved grief and loss issues     A recent loss that was significant to the patient, i.e. loss of job, loss of home, divorce, break-up, etc.     Significant history of trauma and/or abuse issues     A triggering event(s) leading to humiliation, shame or despair     History of impulsive or aggressive behaviors     Significant legal problems including being at risk of incarceration   Protective Factors:    Having people in his/her life that would prevent the patient from considering a suicide attempt (i.e. young children, spouse, parents, etc.)     An absence of chronic health problems or stable and well treated chronic health issues     Having easy access to supportive family members     Having restricted access to highly lethal means of suicide     Risk Status     0. - Very Low Risk:  Evaluation Counselors:  Document in Epic / SBAR to counselor \"Very Low Risk\".      Treatment Counselors:  Reassess upon admission as applicable, assess weekly in progress notes under Dimension 3 and summarize in Discharge / Treatment summary under Dimension 3.     Additional information to support suicide risk rating: There was no additional information to provide at this time.     Ethel Suicide Severity Rating Scale (Lifetime/Recent)  Ethel Suicide Severity Rating (Lifetime/Recent) 8/5/2022 8/18/2022   1. Wish to be Dead (Lifetime) 0 0   2. Non-Specific Active Suicidal Thoughts (Lifetime) 0 0   Actual Attempt (Lifetime) 0 0   Has subject engaged in non-suicidal self-injurious behavior? (Lifetime) 0 0   Interrupted Attempts (Lifetime) 0 0   Aborted or Self-Interrupted Attempt (Lifetime) 0 0   Preparatory Acts or Behavior (Lifetime) 0 0   Calculated C-SSRS Risk Score (Lifetime/Recent) No Risk Indicated No Risk " Indicated

## 2022-08-18 NOTE — PROGRESS NOTES
Initial Services Plan        Before your first treatment group, please do the following    Immediate health & safety concerns: Go to the emergency room if you start to have withdrawal symptoms.  Look for sober housing and a supportive social network.  Obtain personal items (glasses, hearing aides, medicines, diabetes supplies, clothing, etc.).  Look for a support network (such as AA, NA, DBT group, a Shinto group, etc.)    Suggestions for client during the time between intake & completion of treatment plan:  Tour your treatment center (unit or outpatient clinic).  Introduce yourself to the treatment group.  Spend time getting to know your peers.  Complete your psycho-social paperwork.  Complete the problem list for your treatment plan.  Start drug and alcohol use history.  Review your patient or client handbook.    Client issues to be addressed in the first treatment sessions:  Identify motivations(s) for coming to treatment, i.e. legal, family, job, self  Identify concerns about coming into treatment, i.e. fear of failing again, sharing a room in treatment  Identify concerns about contacting boss, PO  Identify outside concerns that may interfere with treatment, i.e. bills not getting paid, brian for children      Gerda Cm [facility]  8/18/2022  12:56 PM
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
## 2022-08-19 ENCOUNTER — HOSPITAL ENCOUNTER (OUTPATIENT)
Dept: BEHAVIORAL HEALTH | Facility: CLINIC | Age: 37
Discharge: HOME | End: 2022-08-19
Attending: FAMILY MEDICINE
Payer: COMMERCIAL

## 2022-08-19 DIAGNOSIS — F19.20 CHEMICAL DEPENDENCY (H): ICD-10-CM

## 2022-08-19 LAB
FENTANYL UR QL: NORMAL
SARS-COV-2 RNA RESP QL NAA+PROBE: NEGATIVE

## 2022-08-19 PROCEDURE — H2035 A/D TX PROGRAM, PER HOUR: HCPCS | Mod: HQ

## 2022-08-19 PROCEDURE — 1002N00001 HC LODGING PLUS FACILITY CHARGE ADULT

## 2022-08-19 PROCEDURE — U0005 INFEC AGEN DETEC AMPLI PROBE: HCPCS | Performed by: FAMILY MEDICINE

## 2022-08-19 PROCEDURE — 80307 DRUG TEST PRSMV CHEM ANLYZR: CPT
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
## 2022-08-20 ENCOUNTER — HOSPITAL ENCOUNTER (OUTPATIENT)
Dept: BEHAVIORAL HEALTH | Facility: CLINIC | Age: 37
Discharge: HOME | End: 2022-08-20
Attending: FAMILY MEDICINE
Payer: COMMERCIAL

## 2022-08-20 PROCEDURE — H2035 A/D TX PROGRAM, PER HOUR: HCPCS | Mod: HQ

## 2022-08-20 NOTE — PROGRESS NOTES
"Pt approached writer and  in tears. Pt said she needed to leave the program. Pt said she knows she is making the wrong decision. Writer gently reminded pt she did not have to discharge. Pt said \"I can't stay\" Writer asked why and pt said \"because I don't want to\" Pt reported she had a safe place to go. Pt was picked up by cab at approx 0900. Pt reported she was going to work on getting into IOP treatment closer to [place].   "

## 2022-08-20 NOTE — GROUP NOTE
Psychoeducation Group Documentation    PATIENT'S NAME: Jayna Fox  MRN:   [id number]  :   1985  ACCT. NUMBER: [id number]  DATE OF SERVICE: 22  START TIME: 12:30 PM  END TIME:  2:30 PM  FACILITATOR(S): Rain Self LADC  TOPIC: BEH Pyschoeducation  Number of patients attending the group: 22  Group Length:  2 Hours    Skills Group Therapy Type: Recovery skills, Healthy behaviors development, and Relationship skills development    Summary of Group / Topics Discussed:    Relationship/social skills, Balanced lifestyle skills, and Relapse prevention skills          Group Attendance:      Patient's response to the group topic/interactions:      Patient appeared to be .         Client specific details:  ***.

## 2022-08-20 NOTE — GROUP NOTE
Psychoeducation Group Documentation    PATIENT'S NAME: Jayna Fox  MRN:   [id number]  :   1985  ACCT. NUMBER: [id number]  DATE OF SERVICE: 22  START TIME: 12:30 PM  END TIME:  2:30 PM  FACILITATOR(S): Rain Self LADC  TOPIC: BEH Pyschoeducation  Number of patients attending the group: 22  Group Length:  2 Hours    Skills Group Therapy Type: Emotion regulation skills, Daily living/independence skills, Healthy behaviors development, and Relationship skills development    Summary of Group / Topics Discussed:    Balanced lifestyle skills and Relapse prevention skills          Group Attendance:      Patient's response to the group topic/interactions:      Patient appeared to be .         Client specific details:  ***.

## 2022-08-20 NOTE — GROUP NOTE
Group Therapy Documentation    PATIENT'S NAME: Jayna Fox  MRN:   [id number]  :   1985  ACCT. NUMBER: [id number]  DATE OF SERVICE: 22  START TIME: 10:00 AM  END TIME: 11:30 AM  FACILITATOR(S): Eleonora Tee  TOPIC: BEH Group Therapy  Number of patients attending the group:  25    Group Length:  2 Hours    Group Therapy Type: Emotion processing    Summary of Group / Topics Discussed:    Co-occurring illnesses symptom management, Coping/DBT informed care, and Trauma informed care      Group Attendance:  Attended group session    Patient's response to the group topic/interactions:  cooperative with task    Patient appeared to be Actively participating, Attentive and Engaged.        Client specific details:  Patient attended group session and was attentive and participative.

## 2022-08-22 NOTE — PROGRESS NOTES
PATIENT NAME: Jayna Fox    : 1985    EVALUATION COUNSELOR:  Lorrie Daigle MA, [facility]    TREATMENT COUNSELORS:  ROGER Mendez, Jody Daniel, [facility]    REFERRAL SOURCE:  Self    PROGRAM:  [employer], [facility] Program.    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    LAST USE DATE: 22    DAYS OF TREATMENT COMPLETED:  2    Patient attended one lecture, two groups, and a one on one session and left within 2 days.  Comprehensive assessment summary was done and treatment plan was not completed. Patient reported shortly after admission that she wanted to discharge from the program. Patient was encouraged to stay and complete the program. Patient left Against Staff Advice and will be allowed to return to  after 30 days if approved by [facility] intake team. Patient reported she was going to work on getting into IOP treatment closer to [place].     Risk ratings from CD evaluation completed by ROGER Mendez on  are as follows:    D1: 0    D2: 0    D3: 2    D4: 2    D5: 4    D6: 3

## 2022-09-12 ENCOUNTER — TELEPHONE (OUTPATIENT)
Dept: ADDICTION MEDICINE | Facility: CLINIC | Age: 37
End: 2022-09-12

## 2022-09-16 ENCOUNTER — TELEPHONE (OUTPATIENT)
Dept: ADDICTION MEDICINE | Facility: CLINIC | Age: 37
End: 2022-09-16

## 2022-09-16 NOTE — TELEPHONE ENCOUNTER
Writer DEEPAK with patient regarding missed appointment on 09/16/2022.   Advised patient to  at [phone] to reschedule.    Gerda Hernández MA

## 2022-10-16 ENCOUNTER — HEALTH MAINTENANCE LETTER (OUTPATIENT)
Age: 37
End: 2022-10-16

## 2022-11-21 ENCOUNTER — TELEPHONE (OUTPATIENT)
Dept: BEHAVIORAL HEALTH | Facility: CLINIC | Age: 37
End: 2022-11-21

## 2022-11-21 ASSESSMENT — PATIENT HEALTH QUESTIONNAIRE - PHQ9
SUM OF ALL RESPONSES TO PHQ QUESTIONS 1-9: 9
10. IF YOU CHECKED OFF ANY PROBLEMS, HOW DIFFICULT HAVE THESE PROBLEMS MADE IT FOR YOU TO DO YOUR WORK, TAKE CARE OF THINGS AT HOME, OR GET ALONG WITH OTHER PEOPLE: SOMEWHAT DIFFICULT
SUM OF ALL RESPONSES TO PHQ QUESTIONS 1-9: 9

## 2022-11-22 ENCOUNTER — HOSPITAL ENCOUNTER (OUTPATIENT)
Dept: BEHAVIORAL HEALTH | Facility: CLINIC | Age: 37
Discharge: HOME | End: 2022-11-22
Attending: FAMILY MEDICINE | Admitting: FAMILY MEDICINE
Payer: COMMERCIAL

## 2022-11-22 VITALS — BODY MASS INDEX: 30.12 KG/M2 | WEIGHT: 170 LBS | HEIGHT: 63 IN

## 2022-11-22 PROCEDURE — H0001 ALCOHOL AND/OR DRUG ASSESS: HCPCS | Mod: GT,95 | Performed by: COUNSELOR

## 2022-11-22 ASSESSMENT — ANXIETY QUESTIONNAIRES
6. BECOMING EASILY ANNOYED OR IRRITABLE: NEARLY EVERY DAY
3. WORRYING TOO MUCH ABOUT DIFFERENT THINGS: SEVERAL DAYS
1. FEELING NERVOUS, ANXIOUS, OR ON EDGE: NEARLY EVERY DAY
GAD7 TOTAL SCORE: 12
GAD7 TOTAL SCORE: 12
5. BEING SO RESTLESS THAT IT IS HARD TO SIT STILL: SEVERAL DAYS
7. FEELING AFRAID AS IF SOMETHING AWFUL MIGHT HAPPEN: MORE THAN HALF THE DAYS
2. NOT BEING ABLE TO STOP OR CONTROL WORRYING: SEVERAL DAYS
4. TROUBLE RELAXING: SEVERAL DAYS

## 2022-11-22 ASSESSMENT — COLUMBIA-SUICIDE SEVERITY RATING SCALE - C-SSRS
2. HAVE YOU ACTUALLY HAD ANY THOUGHTS OF KILLING YOURSELF IN THE PAST MONTH?: NO
5. HAVE YOU STARTED TO WORK OUT OR WORKED OUT THE DETAILS OF HOW TO KILL YOURSELF? DO YOU INTEND TO CARRY OUT THIS PLAN?: NO
3. HAVE YOU BEEN THINKING ABOUT HOW YOU MIGHT KILL YOURSELF?: NO
4. HAVE YOU HAD THESE THOUGHTS AND HAD SOME INTENTION OF ACTING ON THEM?: NO
1. IN THE PAST MONTH, HAVE YOU WISHED YOU WERE DEAD OR WISHED YOU COULD GO TO SLEEP AND NOT WAKE UP?: NO

## 2022-11-22 ASSESSMENT — PAIN SCALES - GENERAL: PAINLEVEL: NO PAIN (0)

## 2022-11-22 NOTE — TELEPHONE ENCOUNTER
11/22/2022  Pt completed her YARITZA Update today. Her YARITZA Update was faxed to [facility] today.    [facility] [id number]
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
## 2023-11-04 ENCOUNTER — HEALTH MAINTENANCE LETTER (OUTPATIENT)
Age: 38
End: 2023-11-04

## 2024-03-05 ENCOUNTER — TELEPHONE (OUTPATIENT)
Dept: BEHAVIORAL HEALTH | Facility: CLINIC | Age: 39
End: 2024-03-05
Payer: COMMERCIAL

## 2024-03-05 NOTE — TELEPHONE ENCOUNTER
"3/5/24: Pt is a(n) adult (18+ out of HS) Seeking as eval for Adult YARITZA Assessment for primary substance use treatment (OP YARITZA programs including Co-occurring).  Appointment scheduled by:  Patient.  (self-pay - complete Cost Estimate)  Caller name:  Jayna MANN    Caller phone #: [phone]  Legal Guardianship Reviewed?  No  Honoring Choices Notified?  No  Brief reason for appt:  Pt called to request eval scheduling for programming, as well as asked to be forwarded to provider's voicemail.     needed?  NO    Contact information verified/updated: Yes (Pt stated that everything is the same).    Guadalupe Navas    \"We have scheduled your evaluation. In the event that your insurance coverage comes back as out of network, you may receive a call to cancel your appointment and direct you to your insurance company for in-network coverage.\"    Disclaimer regarding insurance read to patient?  No   "

## 2024-03-19 ENCOUNTER — TELEPHONE (OUTPATIENT)
Dept: BEHAVIORAL HEALTH | Facility: CLINIC | Age: 39
End: 2024-03-19

## 2024-03-19 NOTE — TELEPHONE ENCOUNTER
3/19/2024    Left VM with pt wondering if they would like to move their appt to later this afternoon. Left my number for a return call.     Richelle Crespo [facility] - Patient Navigator   [email or web address]  Direct phone: [phone]

## 2024-03-22 NOTE — TELEPHONE ENCOUNTER
Left message for initial encounter. Will make another attempt on 3/27. Sending [employer] message now.

## 2024-03-27 NOTE — TELEPHONE ENCOUNTER
Left message reminding of appointment as well as asking if she can call back to finish intake form. MyChart message has already been sent.

## 2024-03-27 NOTE — TELEPHONE ENCOUNTER
Substance Use Evaluation Intake Form    Demographic Information  Patient's legal name:  Jayna Fox  Patient's preferred/chosen name: Jayna  Patient's pronouns: She/Her  Last 4 of SSN: [id number]  Insurance: [facility]  PMI: [id number]   Date of eval: 3/28    Emergency contact: Amari Davidson ([phone])  Does the patient have a legal guardian (if so, phone number for guardian about placement): No  Applicable guardianship and decision making information was sent to honoring choices: No  Does the patient have a psychiatric advance directive: No    Appointment Information  Who is recommending/reason for appointment: Self   / HPSP / other entity that might need assessment: Self    Drug of Choice: Did not ask  Have you ever had a YARITZA assessment (when/where): Has had many in the past had one at all of of the facilities listed below.  Any YARITZA treatment history: [facility], Outpatient, [facility], [facility] [place] outpatient, [facility] information  Primary Care Provider: Dr. Dunbar at [facility] in [place]   Mental Health Providers: [facility]  Are there MH concerns: Yes  Where is the patient in the care system: Pt is in the community and self referred.  Has consent been obtained for Care Everywhere: No    Advised patient/guardian that appt may take up to 120 min: Yes  Reminder to arrive 15 minutes early: Yes  Is there an active my chart: Yes  Have pre-visit forms been sent: Yes

## 2024-03-28 ENCOUNTER — HOSPITAL ENCOUNTER (OUTPATIENT)
Dept: BEHAVIORAL HEALTH | Facility: CLINIC | Age: 39
Discharge: HOME | End: 2024-03-28
Attending: PSYCHIATRY & NEUROLOGY | Admitting: PSYCHIATRY & NEUROLOGY
Payer: COMMERCIAL

## 2024-03-28 VITALS — WEIGHT: 200 LBS | HEIGHT: 63 IN | BODY MASS INDEX: 35.44 KG/M2

## 2024-03-28 DIAGNOSIS — F15.20 AMPHETAMINE USE DISORDER, SEVERE (H): Primary | ICD-10-CM

## 2024-03-28 DIAGNOSIS — F17.200 TOBACCO USE DISORDER, MODERATE, DEPENDENCE: ICD-10-CM

## 2024-03-28 PROCEDURE — H0001 ALCOHOL AND/OR DRUG ASSESS: HCPCS

## 2024-03-28 RX ORDER — VENLAFAXINE HYDROCHLORIDE 75 MG/1
75 CAPSULE, EXTENDED RELEASE ORAL DAILY
COMMUNITY
End: 2024-06-03

## 2024-03-28 RX ORDER — BUPROPION HYDROCHLORIDE 300 MG/1
300 TABLET ORAL EVERY MORNING
COMMUNITY
End: 2024-06-03 | Stop reason: DRUGHIGH

## 2024-03-28 RX ORDER — NAPROXEN 500 MG/1
TABLET ORAL 2 TIMES DAILY WITH MEALS
COMMUNITY
End: 2024-05-30

## 2024-03-28 RX ORDER — HYDROXYZINE HYDROCHLORIDE 50 MG/1
50 TABLET, FILM COATED ORAL 3 TIMES DAILY PRN
COMMUNITY
End: 2024-06-03

## 2024-03-28 ASSESSMENT — ANXIETY QUESTIONNAIRES
4. TROUBLE RELAXING: MORE THAN HALF THE DAYS
5. BEING SO RESTLESS THAT IT IS HARD TO SIT STILL: MORE THAN HALF THE DAYS
7. FEELING AFRAID AS IF SOMETHING AWFUL MIGHT HAPPEN: NOT AT ALL
2. NOT BEING ABLE TO STOP OR CONTROL WORRYING: NOT AT ALL
6. BECOMING EASILY ANNOYED OR IRRITABLE: SEVERAL DAYS
1. FEELING NERVOUS, ANXIOUS, OR ON EDGE: SEVERAL DAYS
GAD7 TOTAL SCORE: 7
3. WORRYING TOO MUCH ABOUT DIFFERENT THINGS: SEVERAL DAYS
GAD7 TOTAL SCORE: 7

## 2024-03-28 ASSESSMENT — PATIENT HEALTH QUESTIONNAIRE - PHQ9: SUM OF ALL RESPONSES TO PHQ QUESTIONS 1-9: 3

## 2024-03-28 ASSESSMENT — COLUMBIA-SUICIDE SEVERITY RATING SCALE - C-SSRS
ATTEMPT LIFETIME: NO
TOTAL  NUMBER OF INTERRUPTED ATTEMPTS LIFETIME: NO
6. HAVE YOU EVER DONE ANYTHING, STARTED TO DO ANYTHING, OR PREPARED TO DO ANYTHING TO END YOUR LIFE?: NO
TOTAL  NUMBER OF ABORTED OR SELF INTERRUPTED ATTEMPTS LIFETIME: NO
2. HAVE YOU ACTUALLY HAD ANY THOUGHTS OF KILLING YOURSELF?: NO
1. HAVE YOU WISHED YOU WERE DEAD OR WISHED YOU COULD GO TO SLEEP AND NOT WAKE UP?: NO

## 2024-03-28 ASSESSMENT — PAIN SCALES - GENERAL: PAINLEVEL: SEVERE PAIN (6)

## 2024-03-28 NOTE — TELEPHONE ENCOUNTER
This patient's YARITZA Service Initiation DAANES information was entered directly into the [place] Department of Human Services DAANES website on 3/28/2024.  Service Initiation Date ID: [id number]   [id number].  Pt severely disorganized, impulsive, agitated.  CO for disorganization while using CPAP.

## 2024-03-28 NOTE — PROGRESS NOTES
[facility]  Provider Name:  SHAMA Diaz/[facility]     Telephone: [phone]      PATIENT'S NAME: Jayna Davidson   PREFERRED NAME: Jayna  PRONOUNS: she/her/hers    MRN: [id number]  : 1985  ADDRESS:   [facility] [address]OS[address] DIEZ [address] [place] [address]  E-MAIL: [email or web address]   ACCT. NUMBER:  [id number]  DATE OF SERVICE: 2024  START TIME: 12:50 pm  END TIME: 2:00 pm  PREFERRED PHONE: [phone]   May we leave a program related message: No  SERVICE MODALITY:  In-person:    Last 4 digits of N #: [id number]     EMERGENCY CONTACT:  Alberto Fox (father)  Tel #: [phone]     Patricia Mclain (therapist)  Tel #: [phone]  Fax #: [phone]      [place] ADULT SUBSTANCE USE DISORDER DIAGNOSTIC ASSESSMENT    Identifying Information:  The patient is a 38 year old, /White female.  The patient was referred for an assessment by self.  The patient attended the session alone.     Chief Complaint:   The reason for seeking services at this time is:  The patient reported the reason for participating in the substance use disorder assessment today on 3/28/2024 was due to the patient's own awareness she needed help and due to encouragement from her 1:1 mental health therapist for the patient to get help.  The patient reported having prior periods of sobriety, including most recently around 12-months from 2022 until 2023 after completing a prior residential substance use disorder treatment program at [facility] in 2023.  The patient reported her relapse with methamphetamine had been precipitated by having multiple life stressors, including having a rotator cuff surgery in 2024, having relationship conflict with her  and due to her step-father dying in early 2024.  The patient reported since relapsing with methamphetamine in 2023, her pattern of use had escalated up to smoking between a 1/4 gram to a 1/2 gram of  methamphetamine on an almost daily basis.  The patient reported she had been unsuccessful in her attempts to stop her use of methamphetamine on her own and she was willing to enter at a minimum an Intensive Outpatient program at one of the [facility] locations or enter a similar intensive outpatient substance use disorder treatment program closer to her home in [place], [place] for substance use disorder treatment or for co-occurring mental health and substance use disorder treatment.  The patient first had a concern about having substance abuse issues in 2015.  The patient reported she had attempted to stop her use of methamphetamine by attending prior substance use disorder treatment in the past.  The patient reported participating in 5 prior substance use disorder treatment programs, with the last substance use disorder treatment program being residential treatment at [facility] in 12/2022.  The patient reported completing that treatment program and she had been sober until relapsing with methamphetamine in 12/2023.  The patient reported having 1 inpatient detoxification admission in age 18.  The patient is not currently receiving any substance use disorder treatment services.  The patient denied having any recent attendance at 12-step or other recovery support group meetings.  The patient does not appear to be in severe withdrawal, an imminent safety risk to self or others, or requiring immediate medical attention and may proceed with the assessment interview.    Social/Family History:  The patient reported growing up in [place], [place].  The patient reported being raised by both of her biological parents in the same family home until age 9, when her parents had /.  The patient primarily lived with her mother after age 9.  The patient denied experiencing or witnessing any verbal, physical or sexual abuse in the family home.  The patient reported overall her childhood was happy until her teenage  years.  The patient reported feeling supported by her father when she was growing up.  The patient reported being raised in the Jain Sabianism (Judaism).  The patient described current relationships with her family of origin as being distant at this time with her mother, but closer with her father.      The patient describes her cultural background as being a /White female.  Cultural influences and impact on patient's life structure, values, norms, and healthcare: The patient denied cultural concerns had an impact on life structure, values, norms, or healthcare.  Contextual influences on patient's health include: Family Factors: family history includes Anxiety Disorder in her mother; Depression in her mother; Substance Abuse in her mother and paternal half-brother.  The patient identified her preferred language to be [place] Island[place].  The patient reported she does not need the assistance of an  or other support involved in therapy.  The patient reported she is not currently involved in community deni activities.  The patient reported her spirituality would have a very positive impact on her recovery.    The patient reported having no significant delays in developmental tasks.  The patient's highest education level was high school graduate.  The patient identified the following learning problems: none reported.  The patient reports she is able to understand written materials.    The patient reported the following relationship history: The patient reported being  1 time and she is still  to her .  The patient identified as being heterosexual and she reported being  to her  for the past 4 years.  The patient reported having 2 children, a 13-year old daughter and a 4-year old son.     The patient's current living/housing situation involves staying in own home/apartment.  The patient reported living with her  and her 2 children and she reported her housing is  stable, but the patient had been staying at her step-father's home cleaning since he passed away on 2/13/2024.  The patient denied having any concerns regarding her immediate living environment and/or neighborhood, but she had been unable to maintain abstinence from methamphetamine while living there.  The patient identified her , her father, and her siblings as being her primary support network at this time.  The patient identified the quality of her relationships with her support network as being good overall, but somewhat strained due to the patient's substance abuse.  The patient would like the following people involved in treatment services if recommended: None at this time.     The patient reported engaging in the following recreational/leisure activities: listening to music, spending time with her children, watching TV and going to the pool.  The patient reported engaging in the following recreation/leisure activities while using alcohol or other non-prescribed mood altering chemicals: The patient's use of methamphetamine had been done independently of her social/recreational/leisure activities.  The patient reported the following people are supportive of her recovery: her , her father, her siblings, a few close friends, and her coworkers.  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  The patient reported her current income is obtained from employment, from section 8 and from tax 2023 return.  The patient reported having financial stressors at this time, including having only minimal income at this time and money being tight at this time.  Cultural and socioeconomic factors do not affect the patient's access to services.    The patient reported the following substance related arrests or legal issues: The patient reported being arrested for a felony fifth degree drug possession  charge in [facility] on 4/13/2022, when she was in possession of methamphetamine.  In addition, the patient had a history of 5 YUE charges and 1 disorderly conduct charge in 2008.  The patient reported currently being on court probation in [facility] for a felony fifth degree drug possession charge.    Patient's Strengths and Limitations:  The patient identified the following strengths or resources that will help her succeed in treatment: commitment to health and well being, family support, and motivation.  Things that may interfere with the patient's success in treatment include: lack of a sober peer support network, financial stressors, mental health concerns, and peer network mainly consists of other alcohol and/or drug users.     Assessments:  The following assessments were completed by patient for this visit:  PHQ9:       8/21/2017    11:00 AM 8/4/2022    11:54 AM 8/18/2022     2:00 PM 11/21/2022     2:23 PM 3/28/2024     1:00 PM   PHQ-9 SCORE   PHQ-9 Total Score MyChart  5 (Mild depression)  9 (Mild depression)    PHQ-9 Total Score 2 5 6 9 3     GAD7:       8/21/2017    11:00 AM 8/4/2022    11:55 AM 8/18/2022     2:00 PM 11/22/2022     8:00 AM 3/28/2024     1:00 PM   ABUNDIO-7 SCORE   Total Score  4 (minimal anxiety)      Total Score 1 4 5 12 7     PROMIS 10-Global Health (all questions and answers displayed):       8/4/2022    12:18 PM 11/21/2022     2:39 PM 3/28/2024     1:00 PM   PROMIS 10   In general, would you say your health is: Very good Good    In general, would you say your quality of life is: Very good Very good    In general, how would you rate your physical health? Very good Good    In general, how would you rate your mental health, including your mood and your ability to think? Good Good    In general, how would you rate your satisfaction with your social activities and relationships? Fair Good    In general, please rate how well you carry out your usual social activities and roles Good Good     To what extent are you able to carry out your everyday physical activities such as walking, climbing stairs, carrying groceries, or moving a chair? Completely Mostly    In the past 7 days, how often have you been bothered by emotional problems such as feeling anxious, depressed, or irritable? Often Often    In the past 7 days, how would you rate your fatigue on average? Mild Moderate    In the past 7 days, how would you rate your pain on average, where 0 means no pain, and 10 means worst imaginable pain? 0 4    In general, would you say your health is: 4 3 4   In general, would you say your quality of life is: 4 4 4   In general, how would you rate your physical health? 4 3 3   In general, how would you rate your mental health, including your mood and your ability to think? 3 3 4   In general, how would you rate your satisfaction with your social activities and relationships? 2 3 4   In general, please rate how well you carry out your usual social activities and roles. (This includes activities at home, at work and in your community, and responsibilities as a parent, child, spouse, employee, friend, etc.) 3 3 3   To what extent are you able to carry out your everyday physical activities such as walking, climbing stairs, carrying groceries, or moving a chair? 5 4 4   In the past 7 days, how often have you been bothered by emotional problems such as feeling anxious, depressed, or irritable? 4 4 3   In the past 7 days, how would you rate your fatigue on average? 2 3 2   In the past 7 days, how would you rate your pain on average, where 0 means no pain, and 10 means worst imaginable pain? 0 4 6   Global Mental Health Score 11 12 15   Global Physical Health Score 18 13 14   PROMIS TOTAL - SUBSCORES 29 25 29     [place] Suicide Severity Rating Scale (Lifetime/Recent)      8/5/2022     8:00 AM 8/18/2022     1:00 PM 11/22/2022     8:00 AM 3/28/2024     1:00 PM   [place] Suicide Severity Rating (Lifetime/Recent)   Q1  Wished to be Dead (Past Month)   no    Q2 Suicidal Thoughts (Past Month)   no    Q3 Suicidal Thought Method   no    Q4 Suicidal Intent without Specific Plan   no    Q5 Suicide Intent with Specific Plan   no    Level of Risk per Screen   low risk    Q1 Wish to be Dead (Lifetime) N N  N   Q2 Non-Specific Active Suicidal Thoughts (Lifetime) N N  N   Actual Attempt (Lifetime) N N  N   Has subject engaged in non-suicidal self-injurious behavior? (Lifetime) N N  N   Interrupted Attempts (Lifetime) N N  N   Aborted or Self-Interrupted Attempt (Lifetime) N N  N   Preparatory Acts or Behavior (Lifetime) N N  N   Calculated C-SSRS Risk Score (Lifetime/Recent) No Risk Indicated No Risk Indicated  No Risk Indicated     GAIN-sliding scale:      8/5/2022     8:00 AM 11/22/2022     8:00 AM 3/28/2024     1:00 PM   When was the last time that you had significant problems...   with feeling very trapped, lonely, sad, blue, depressed or hopeless about the future? Past month Past month 2 to 12 months ago   with sleep trouble, such as bad dreams, sleeping restlessly, or falling asleep during the day? Past Month Past Month Past Month   with feeling very anxious, nervous, tense, scared, panicked or like something bad was going to happen? Past month Past month 2 to 12 months ago   with becoming very distressed & upset when something reminded you of the past? Past month Past month 2 to 12 months ago   with thinking about ending your life or committing suicide? Never Never Never          8/5/2022     8:00 AM 11/22/2022     8:00 AM 3/28/2024     1:00 PM   When was the last time that you did the following things 2 or more times?   Lied or conned to get things you wanted or to avoid having to do something? Past month Past month 2 to 12 months ago   Had a hard time paying attention at school, work or home? Past month Past month 2 to 12 months ago   Had a hard time listening to instructions at school, work or home? Never Never 1+ years ago   Were  a bully or threatened other people? 1+ years ago 2 to 12 months ago Never   Started physical fights with other people? Never Never Never     Personal and Family Medical History:  The patient did report a family history of mental health concerns.  The patient reported family history includes Anxiety Disorder in her mother; Depression in her mother; Substance Abuse in her mother and paternal half-brother.     The patient reported the following previous mental health diagnoses: The patient reported a history of Depression NOS, Anxiety disorder NOS, and PTSD.  The patient reported her primary mental health symptoms include: depression, anxiety, sleep problems, and symptoms related to past traumatic life events and these do not impact her ability to function.  The patient has received mental health services in the past: The patient reported taking her prescribed psychotropic medications as prescribed.  The patient reported she had been working with her current 1:1 mental health therapist past 5 years.  Psychiatric Hospitalizations: None.  The patient denies a history of civil commitment.  Current mental health services/providers include:  The patient reported taking her prescribed psychotropic medications as prescribed.  The patient reported she had been working with her current 1:1 mental health therapist past 5 years.    The patient has had a physical exam to rule out medical causes for current symptoms.  Date of last physical exam was within the past year. The patient was encouraged to follow up with PCP if symptoms were to develop.  The patient has a non-[facility] Primary Care Provider. Their PCP is Chris Mas at the [facility] in [place], [place].  The patient reported the following medical concerns:   Past Medical History:   Diagnosis Date    Acute shoulder pain     Anxiety     Depressive disorder     History of hypertension     History of rotator cuff surgery     PTSD (post-traumatic stress disorder)    The patient  reported taking her medications as prescribed and following the recommendations of her healthcare providers.  The patient reported pain concerns including having some ongoing shoulder pain after having a rotator cuff surgery in 1/2024.  The patient did not feel there was any need for additional help addressing this pain concerns.  The patient denied being pregnant.  There are not significant appetite / nutritional concerns / weight changes.  The patient does not report having a history of an eating disorder.  The patient does not report a history of head injury / trauma / cognitive impairment.      The patient reported current medications as:   Outpatient Medications Marked as Taking for the 3/28/24 encounter (Hospital Encounter) with Lv Anderson LADC   Medication Sig    acetaminophen (TYLENOL) 325 MG tablet Take 500 mg by mouth every 4 hours as needed for mild pain    alum & mag hydroxide-simethicone (MAALOX) 200-200-20 MG/5ML SUSP suspension Take 30 mLs by mouth every 6 hours as needed for indigestion    buPROPion (WELLBUTRIN XL) 300 MG 24 hr tablet Take 300 mg by mouth every morning    hydrOXYzine HCl (ATARAX) 50 MG tablet Take 50 mg by mouth 3 times daily as needed for itching    naproxen (EC-NAPROSYN) 500 MG EC tablet Take by mouth 2 times daily (with meals)    venlafaxine (EFFEXOR XR) 75 MG 24 hr capsule Take 75 mg by mouth daily     Medication Adherence:  The patient reported taking her prescribed medications as prescribed.  The patient reported being able  to self-administer her medications.    Patient Allergies:    No Known Allergies    Medical History:    Past Medical History:   Diagnosis Date    Acute shoulder pain     Anxiety     Depressive disorder     History of hypertension     History of rotator cuff surgery     PTSD (post-traumatic stress disorder)       Substance Use:  The patient reported the following biological family members or relatives with chemical health issues: family history  includes Substance Abuse in her mother and paternal half-brother.  The patient reported participating in 5 prior substance use disorder treatment programs, with the last substance use disorder treatment program being residential treatment at [facility] in 12/2022.  The patient reported completing that treatment program and she had been sober until relapsing with methamphetamine in 12/2023.  The patient reported having 1 inpatient detoxification admission in age 18.  The patient is not currently receiving any substance use disorder treatment services.  The patient denied having any recent attendance at 12-step or other recovery support group meetings.       Substance Age of first use Pattern and duration of use (include amounts and frequency) Date of last use     Withdrawal potential Route of use   Has used Alcohol 13 The patient reported her heaviest use of alcohol had been between the ages of 19 and 25, when she reported a pattern of drinking 6-8 mixed drinks 2-3 times per week.   13 years ago No Oral   Has used Marijuana   13 The patient reported her heaviest use of THC/cannabis had been between the ages of 18 and 19, when she reported a pattern of smoking few grams of THC/cannabis on a daily basis.   21 No Smoke   Has used Amphetamines   18 The patient reported her longest period of time without using methamphetamine or other drugs had been between 2016 and 2020 and her relapse with methamphetamine in 2020 had been due to having multiple life stressors at that time and due to being around other people who were abusing methamphetamine.     The patient reported her heaviest use of methamphetamine had been from 2020 until 12/2022, when she reported a pattern of smoking 1+ gram of methamphetamine on a daily basis.    The patient reported she had been sober again after attending the residential substance use disorder treatment program at [facility] in 12/2022 until relapsing with methamphetamine in 12/2023.    The patient  reported since relapsing with methamphetamine in 12/2023, her pattern of use had escalated up to smoking between a 1/4 gram to a 1/2 gram of methamphetamine on an almost daily basis.    3/27/2024    (1 bowl)  No Smoke   Has not used Cocaine/crack           Has not used Hallucinogens        Has not used Inhalants        Has not used Heroin        Has not used Other Opiates        Has not used Benzodiazepines          Has not used Barbiturates        Has not used Over the counter medications        Has used Nicotine 14 The patient reported a current pattern of smoking a half a pack of cigarettes on a daily basis.  3/28/2024  Yes  Smoked    Has use Caffeine 11 The patient reported a current pattern of drinking 3-4 cups of coffee on a daily basis.    3/28/2024  Yes  Oral   Has not used other substances not listed above:  Identify:           The patient reported the following problems as a result of their substance use: relationship problems, family problems, legal issues, and mental health symptoms which were exacerbated by her use of methamphetamine.  The patient is concerned about substance use.  The patient reported her recovery goal is: The patient's plan and goal is to abstain from methamphetamine and from all other non-prescribed mood altering chemicals.     The patient reports experiencing the following withdrawal symptoms within the past 12 months: agitation, fatigue, sad/depressed feeling, vivid/unpleasant dreams, irritability, and anxiety/worry and the following within the past 30 days: agitation, fatigue, sad/depressed feeling, vivid/unpleasant dreams, irritability, and anxiety/worry. (DSM-11)  The patient reported having urges to use methamphetamine and nicotine.  (DSM-4)  The patient reported she has not used more methamphetamine than intended and over a longer period of time than intended.  (DSM-1)  The patient reported she has had unsuccessful attempts to cut down or control use of methamphetamine and  nicotine.  (DSM-2)  The patient reported her longest period of time without using methamphetamine or other drugs had been between 2016 and 2020 and her relapse with methamphetamine in 2020 had been due to having multiple life stressors at that time and due to being around other people who were abusing methamphetamine.   The patient reported she has needed to use more methamphetamine and nicotine to achieve the same effect.  (DSM-10)  The patient does report diminished effect with use of same amount of methamphetamine and nicotine.  (DSM-10)     The patient does report a great deal of time is spent in activities necessary to obtain, use, or recover from methamphetamine effects.  (DSM-3)  The patient does report important social, occupational, or recreational activities are given up or reduced because of methamphetamine use.  (DSM-7)  Methamphetamine use is continued despite knowledge of having a persistent or recurrent physical or psychological problem that is likely to have been caused or exacerbated by use.   (DSM-9)  The patient reported the following problem behaviors while under the influence of substances: The patient reported having relationship conflict with her  and being more impulsive when under the influence of methamphetamine.  (DSM-6)  The patient reported recurrent use of methamphetamine in physically hazardous such as driving a motor vehicle while under the influence.  (DSM-8)    The patient reported her substance use has not negatively impacted her ability to function in a school setting within the past 12-months.  (DSM-5)  The patient reported her substance use has not negatively impacted her ability to function in a work setting within the past 12-months.  (DSM-5)  The patient's demographics and history impact her recovery in the following ways: family history includes Anxiety Disorder in her mother; Depression in her mother; Substance Abuse in her mother and paternal half-brother.  The  patient reported engaging in the following recreation/leisure activities while using alcohol or other non-prescribed mood altering chemicals: The patient's use of methamphetamine had been done independently of her social/recreational/leisure activities.  The patient reported the following people are supportive of her recovery: her , her father, her siblings, a few close friends, and her coworkers.     The patient denied ever participating in a gambling treatment program.  The patient reported going to the casino around 3-4 times per week, when she would play shot machines.  The patient reported losing money that could be spent on other things, but not going into significant debt due to her gambling.  The patient does not have other addictive behaviors she is concerned about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

## 2024-03-28 NOTE — TELEPHONE ENCOUNTER
Summary of Patient Care Communication Handoff to Patient Navigator Coordinator    PATIENT'S NAME:  Jayna Davidson  MRN:  [id number]  :   1985    DATE OF SERVICE:  3/19/24    Adult Substance Use Disorder     Level of Care Recommended:    1.)  It was recommended the patient abstain from methamphetamine and from all other non-prescribed mood altering chemicals.   2.)  Follow all of the recommendations of her medical and mental health providers.  3.)  Follow all of the terms and conditions of her court probation, including participating in random alcohol and drug screening with court probation as directed.   4.)  Enter at a minimum an Intensive Outpatient program at one of the [facility] or enter a similar intensive outpatient substance use disorder treatment program closer to her home in [place], [place] for substance use disorder treatment or for co-occurring mental health and substance use disorder treatment.  5.)  Follow all of the recommendations of her substance use disorder treatment providers.  6.)  Attend 12-step or other recovery support group meetings on a weekly basis.     7.)  Additionally, if the patient were unable to maintain abstinence from methamphetamine and from all other non-prescribed mood altering chemicals at the intensive outpatient substance use disorder treatment level of care, it would be recommended she have an updated substance use disorder assessment to have further recommendations made as needed.         The patient reported she was willing to follow the above recommendations.    Schedule Preferences: No schedule preference (Mornings, Afternoons or Evenings)    Referral Needed:  OP [facility] Programming (P: CD ADULT IOP INTAKE POOL) and [facility] Outpatient UR (P [facility] OUTPATIENT UR)    Other Referrals Needed:  None    Is this a [facility] Referral?  [facility] Screening: No.    Are there any potential barriers for entrance into programmatic care?  distance from home  to in-person [facility] outpatient YARITZA treatment programs    Specialty Care Coordinator Referral Needed:  No    Mental Health Referral Needed: No    Release of Information Needed:    Emergency Notification/Contact: (REQUIRED): Completed with the patient by the [address]  Therapist: Completed with the patient by SHAMA Feng/ANGEL    Faxing Needed: See below:    Please fax or e-mail this patient's 3/28/2024 substance use disorder assessment to Kathy Mclain (therapist) for case management and please verify the fax or e-mail had been successfully transmitted and document the results in a telephone note.       Kathy Mclain (therapist)  Tel #: [phone]  Fax #: [phone]       Follow up Requests:      1.)  Referral to designated [facility] Program.  2.)  Please complete the above requested faxing and/or emailing.  3.)  Community Referral: as needed.    Comments: The patient lives in [place] and she will likely NEED to attend an intensive outpatient substance use disorder treatment program closer to her home than is available from [facility], but the patient was NOT yet sure where she would want referrals made for an intensive outpatient substance use disorder treatment program closer to her home.    Lv Anderson, [facility]    Patient Navigator Coordinator Contact Information  Pool Message: [email or web address] ([phone])   Phone:  [phone]  Fax:  [phone]  Email:  [email or web address]

## 2024-03-29 NOTE — TELEPHONE ENCOUNTER
3/29/2024    Pt's YARITZA eval successfully faxed to pt's therapist.     Richelle Crespo [facility] - Patient Navigator   [email or web address]  Direct phone: [phone]

## 2024-03-29 NOTE — TELEPHONE ENCOUNTER
The below telephone note was routed to the [facility] Patient Navigator at: [email or web address] [[id number]] on 3/29/2024 as a referral for IOP treatment at [facility].    A.)  Name: Jayna Davidson Tel #: [phone]  B.)  MRN: [id number]  C.)  Referral is for: an Intensive Outpatient program at one of the [facility] locations for substance use disorder treatment.  D.)  CPA needs to be completed for [facility] only: NO  E.)  Notes: Pt lives in [facility] best option? I will also send her an email with some options as back up but looks like she wasn't sure when Ed talked with her.     Thanks,    Richelle Crespo

## 2024-04-01 NOTE — TELEPHONE ENCOUNTER
4/1/2024    Sent email to pt:    Philip Dorantes -    I wanted to let you know I made the referral for [place] outpatient programming, but I know you live in [place]. I am seeing if you want options for programming in [place] as well, or have referral places in mind? Just wanted to open that conversation if it was something you were thinking.   If there is anything I can help with, please reach out.       Richelle Crespo [facility] - Patient Navigator   [email or web address]  Direct phone: [phone]

## 2024-04-02 NOTE — TELEPHONE ENCOUNTER
4/2/2024    Received ANABEL - uploaded to chart and faxed to KYLEIGH Crespo [facility] - Patient Navigator   [email or web address]  Direct phone: [phone]

## 2024-04-02 NOTE — TELEPHONE ENCOUNTER
4/2/2024    Pt had replied to my email stating she is wanting a referral to [facility]. Pt emailed again and reported having further questions about finding her eval on [employer].     I called pt and we discussed options. Pt's YARITZA eval was securely emailed to pt and a Docusign was sent to pt to sign for [facility]. Pt said she would sign ANABEL and will reach out if she has any further questions.   Once ANABEL is received, will upload to chart and fax to [facility].     Richelle Crespo [facility] - Patient Navigator   [email or web address]  Direct phone: [phone]

## 2024-05-16 ENCOUNTER — TELEPHONE (OUTPATIENT)
Dept: BEHAVIORAL HEALTH | Facility: CLINIC | Age: 39
End: 2024-05-16

## 2024-05-16 NOTE — TELEPHONE ENCOUNTER
5/16/2024    Pt called and left VM inquiring about a higher level of care. This writer attempted to call pt back, however voicemail is full. Emailed pt the following:    Philip Dorantes -    I got your voicemail, tried to call you back but your voicemail is full.   So I looked at your assessment, unfortunately you would need an update for [facility].   First option would be to call the Mental Health and Addiction Services Line: [phone] and schedule an update (if you are really wanting [facility] I would go that route).   The other option would be to have the [facility] you are in do an update/discharge summary with updated risk rating and recommendations and fax to: [phone]. That option [facility] may still require an update.   If you want other referrals to other residential programs, we can try that too - just would need to sign ROIs.   Let me know how you want to move forward    Richelle Crespo [facility] - Patient Navigator   [email or web address]  Direct phone: [phone]

## 2024-05-22 NOTE — TELEPHONE ENCOUNTER
5/22/2024    Received external referral (update) from [place] for [facility]. [facility] to [facility] admissions team [facility]     Richelle Crespo [facility] - Patient Navigator   [email or web address]  Direct phone: [phone]     No

## 2024-05-23 NOTE — TELEPHONE ENCOUNTER
Patient called [facility] to find out where she was on the [facility] waitlist. Writer was planning on calling patient today to get her scheduled for [facility]. Writer asked patient if she would be ready to admit to the program tomorrow 5/24/24 and pt stated she has appts for her children that she wants to attend. Writer explained to pt there is a long waitlist right now for the program so we need to know if she will be ready to admit within the next few days. Pt is talking it over with her  and calling writer back regarding scheduling.

## 2024-05-28 ENCOUNTER — TRANSFERRED RECORDS (OUTPATIENT)
Dept: HEALTH INFORMATION MANAGEMENT | Facility: CLINIC | Age: 39
End: 2024-05-28
Payer: COMMERCIAL

## 2024-05-28 NOTE — TELEPHONE ENCOUNTER
Pt arrived but did not admit as she used 12 hours prior to admission time. Pt is rescheduled for 5/30 at 12 pm. Pt understands she must be 24 hours sober prior to admission.

## 2024-05-30 ENCOUNTER — TELEPHONE (OUTPATIENT)
Dept: BEHAVIORAL HEALTH | Facility: CLINIC | Age: 39
End: 2024-05-30
Payer: COMMERCIAL

## 2024-05-30 ENCOUNTER — HOSPITAL ENCOUNTER (OUTPATIENT)
Dept: BEHAVIORAL HEALTH | Facility: CLINIC | Age: 39
Discharge: HOME | End: 2024-05-30
Attending: FAMILY MEDICINE
Payer: COMMERCIAL

## 2024-05-30 PROBLEM — F19.20 CHEMICAL DEPENDENCY (H): Status: ACTIVE | Noted: 2024-05-30

## 2024-05-30 LAB — CREAT UR-MCNC: 42 MG/DL

## 2024-05-30 PROCEDURE — 80367 DRUG SCREENING PROPOXYPHENE: CPT | Performed by: FAMILY MEDICINE

## 2024-05-30 PROCEDURE — 1002N00001 HC LODGING PLUS FACILITY CHARGE ADULT

## 2024-05-30 PROCEDURE — H2035 A/D TX PROGRAM, PER HOUR: HCPCS

## 2024-05-30 PROCEDURE — 80359 METHYLENEDIOXYAMPHETAMINES: CPT | Performed by: FAMILY MEDICINE

## 2024-05-30 RX ORDER — ACETAMINOPHEN 500 MG
500-1000 TABLET ORAL EVERY 8 HOURS PRN
COMMUNITY

## 2024-05-30 RX ORDER — LORATADINE 10 MG/1
10 TABLET ORAL DAILY PRN
COMMUNITY

## 2024-05-30 RX ORDER — IBUPROFEN 200 MG
600 TABLET ORAL EVERY 6 HOURS PRN
COMMUNITY

## 2024-05-30 RX ORDER — GUAIFENESIN/DEXTROMETHORPHAN 100-10MG/5
10 SYRUP ORAL EVERY 4 HOURS PRN
COMMUNITY
End: 2024-06-28

## 2024-05-30 RX ORDER — AMOXICILLIN 250 MG
2 CAPSULE ORAL DAILY PRN
COMMUNITY

## 2024-05-30 RX ORDER — MAGNESIUM HYDROXIDE/ALUMINUM HYDROXICE/SIMETHICONE 120; 1200; 1200 MG/30ML; MG/30ML; MG/30ML
30 SUSPENSION ORAL EVERY 6 HOURS PRN
COMMUNITY
End: 2024-06-28

## 2024-05-30 ASSESSMENT — ANXIETY QUESTIONNAIRES
IF YOU CHECKED OFF ANY PROBLEMS ON THIS QUESTIONNAIRE, HOW DIFFICULT HAVE THESE PROBLEMS MADE IT FOR YOU TO DO YOUR WORK, TAKE CARE OF THINGS AT HOME, OR GET ALONG WITH OTHER PEOPLE: SOMEWHAT DIFFICULT
6. BECOMING EASILY ANNOYED OR IRRITABLE: SEVERAL DAYS
3. WORRYING TOO MUCH ABOUT DIFFERENT THINGS: NOT AT ALL
4. TROUBLE RELAXING: SEVERAL DAYS
5. BEING SO RESTLESS THAT IT IS HARD TO SIT STILL: SEVERAL DAYS
GAD7 TOTAL SCORE: 4
GAD7 TOTAL SCORE: 4
1. FEELING NERVOUS, ANXIOUS, OR ON EDGE: SEVERAL DAYS
2. NOT BEING ABLE TO STOP OR CONTROL WORRYING: NOT AT ALL
7. FEELING AFRAID AS IF SOMETHING AWFUL MIGHT HAPPEN: NOT AT ALL

## 2024-05-30 ASSESSMENT — COLUMBIA-SUICIDE SEVERITY RATING SCALE - C-SSRS
6. HAVE YOU EVER DONE ANYTHING, STARTED TO DO ANYTHING, OR PREPARED TO DO ANYTHING TO END YOUR LIFE?: NO
2. HAVE YOU ACTUALLY HAD ANY THOUGHTS OF KILLING YOURSELF IN THE PAST MONTH?: NO
1. IN THE PAST MONTH, HAVE YOU WISHED YOU WERE DEAD OR WISHED YOU COULD GO TO SLEEP AND NOT WAKE UP?: NO
2. HAVE YOU ACTUALLY HAD ANY THOUGHTS OF KILLING YOURSELF LIFETIME?: NO
1. IN THE PAST MONTH, HAVE YOU WISHED YOU WERE DEAD OR WISHED YOU COULD GO TO SLEEP AND NOT WAKE UP?: NO

## 2024-05-30 ASSESSMENT — PATIENT HEALTH QUESTIONNAIRE - PHQ9: SUM OF ALL RESPONSES TO PHQ QUESTIONS 1-9: 5
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
## 2024-05-30 NOTE — TELEPHONE ENCOUNTER
----- Message from ANGEL Westfall sent at 5/30/2024  1:36 PM CDT -----  Regarding: Admission  Pt arrived and admitted to [facility].

## 2024-05-30 NOTE — PROGRESS NOTES
[facility] Assessment      Vital signs:     B/P164/111, P121, R20, T98, sats 100%ra      Direct admission    Counselor: Group E  Drug of Choice: Meth  Last use: 5/28  Home clinic/MD: Chris Perez  [facility] in [place]  Psychiatrist/therapist: Kathy HEALY    Medical history/current conditions:    Strain Muscles And Tendons Rotator Cuff Right Shoulder Subsequent     Elevated Blood Pressure Without Hypertension     H&P Screen:  H&P within the last 90 days: Yes.  Date: March 11th Location: [facility]      Mental Health diagnosis:   Psychoactive Substance Moderate Or Severe Use Disorder   Posttraumatic Stress Disorder Prolonged   Anxiety Generalized Disorder     Medication compliant?: yes  Recent sucidal thoughts? no     When? N/a  Current thought of self-harm? N/a    Plan? N/a    Pain assessment:   Pt. Experiencing pain at this time?  Yes.  Rating on 0-10 scale: (1-10 scale): 3.  Location: R shoulder  Recent  Result of: Rotator cuff.       Nursing Assessment Summary:    LP Falls assessment    Has patient had a fall(s) in the last 6 months?  no  If yes, what were the circumstances surrounding the fall(s)? no  Does patient have gait dysfunctions? (limping, dragging of toes, shuffling feet, unsteadiness, difficulty standing /walking)  No  Does patient appear to have deconditioning/muscle loss/malnutrition/fatigue? (due to inactivity, bedrest (long hospitalization), medical condition(s) No  Does patient experience confusion, dizziness or vertigo? No  Is patient visually impaired? No   Does patient have any adaptive equipment (hearing aids, wheelchair, walker, prosthesis, crutches, cane, etc..) No  Is patient taking 2 or more of the following medications?  anticonvulsants, anti-hypertensives, diuretics, laxatives, sedatives, and psychotropics (single-select) No  Is patient taking medication (s) that would cause urinary or bowel urgency (ex: lactulose/Furosemide)? No  Does patient have a  medical condition (ex: Diabetes, Liver Disease, Respiratory Diseases, Chronic Pain, Heart Disease, Neuropathy, Seizure like Disorder, etc...) that could affect balance/gait or risk for falls? No    If yes to any of the above educate patient on fall prevention while at [facility].           Floors clutter/obstacle free           Proper footwear/[place] shoes          Encourage the use of appropriate lighting.            Adjust walking speed accordingly          Recommend caution going on longer walks. Try shorter walks and see how you do first.  Use elevators when appropriate.          Notify staff if you are experiencing fatigue, weakness, drowsiness/ dizziness or have had a fall.           Use adaptive equipment as recommended          Wheelchairs must be managed and propelled independently without staff assistance           Expectation of complete independence with all cares and compliance.  Report to staff if having difficulty     LP patient who poses a potential falls risk will be advised of the following:  Please follow the recommendations as listed above or it may affect your treatment plan.  Your treatment team would have to evaluate if this level of care is appropriate for you.    Patient acknowledges and verbalizes understanding of the above criteria?  yes  Support staff / counselors notified of pt Fall Screen and plan of prevention. LPRN to re-assess as appropriate. White board and SBAR updated  n/a  ____________________________________________________________________________________________________________________________  RN Assessment of Infectious Disease concerns:    Infectious disease concerns no    If infectious disease concerns, LPRN to notify staff for appropriate rooming assignment OR discharge if pt is not appropriate to stay at LP n/a  Admitting nurse review the following with pt:  For pt's that are approved to stay at LP with history of Infectious Disease, pt will be in private room and have  private bathroom. Pt must be educated with appropriate hand hygeine and verbalize understanding.  Pt must verbalize that they understand and must comply with containing bodily fluids.  Pt will wash  laundry separately.  N/a  Notify Infection Prevention with any concerns or questions   _____________________________________________________________________________________________________________________________     Community Medical Screen for COVID-19    Do you have any of the following NEW or worsening symptoms NOT attributed to pre-existing conditions?    No    Fever of 100.0  F (37.8 C) or over  Chills  Cough  Shortness of Breath  Loss of taste or smell  Generalized body aches  Persistent headache  Sore throat (or trouble eating or drinking in young children?)  Nausea, Vomiting, or diarrhea (loose stools)    If pt responds YES to any of the symptoms / Positive COVID-19  without symptoms pt will need to leave unit immediately and can return in 6 days from discharge date.      Did you test positive for COVID-19 in the last 10 days or are you waiting on the test results due to an exposure or symptoms?  No    Has anyone told you to self-quarantine due to exposure to someone with COVID-19?  No    COVID - 9 positive patients must quarantine for five days.  They can return to in-person therapy on day 6 following Duration of Special Precautions for COVID-19.      COVID-19 Test completed by LPRN ? No    COVID-19 - Pt informed of the following while at LP:    1) If pt has any of the symptoms below, notify staff immediately.    Fever   Cough   Shortness of breath or difficulty breathing   Chills   Repeated shaking with chills  Muscle pain   ____________________________________________________________________________________________________________________________    RN Assessment of Patient's Ability to Safely Manage and Self-Administer Respiratory Treatments    Has experience in the management of Respiratory (If NA,  indicate and move to Integrative Therapies):  n/a    I    Integrative Therapies: Essential Oils    Patient requesting essential oil inhaler to manage (Mood/Mental Health/Physical/Spiritual symptoms).     Discussed appropriate use of essential oil inhalers and instructed patient not to leave labeled product out on unit.     Patient was screened for kidney disease, asthma/reactive airway disease and rashes and wounds or 1st trimester of pregnancy    List Essential Oils requested by pt no LIMIT ONE ESSENTIAL OIL PER PT    Patient verbalized and demonstrated understanding of how to use essential oil inhaler correctly and will notify LP RN with any concerns or side effects. Patient agrees not to share their essential oil inhaler with other clients.  Continue to support the patient in safely utilizing integrative therapies as able to manage symptoms during treatment.     Patient tobacco use:    Do you use tobacco? yes   Type? cigarettes  How often? daily  How much? 1/2 day   Are you interested in quitting? no    NRT (Nicotine Replacement therapy) ordered? yes   Pt is aware of the dangers of tobacco cessation and in contemplation.    Pt given written education.    Nutritional Assessment:    Have you ever purged, binged or restricted yourself as a way to control your weight?   No     Are you on a special diet?   No     Do you have any concerns regarding your nutritional status?   No     Have you had any appetite changes in the last 3 months?   No   Have you had weight loss or weight gain of more than 10 lbs in the last 3 months?   If patient gained or lost more than 10 lbs, then refer to program RN / attending Physician for assessment.   No   Was the patient informed of BMI?       No   Have you engaged in any risk-taking behavior that would put you at risk for exposure to blood-borne or sexually transmitted diseases?   No   Do you have any dental problems?   No         LPRN reviewed with pt the following information:   Yes  Pt informed if leaving AMA, they will be directed to take medications with them.  Should pt's choose to leave medications at LP, ALL medications will be packaged and delivered to inpatient pharmacy for temporary storage.  Inpt pharmacy will follow protocol to reach out to pt.  If pharmacy unable to reach pt and/or pt does not retrieve medications, they will be destroyed per inpt pharmacy protocol.   In regards to 'medical concerns/medication refill expectations' while at LP:  Pt understands LP has no rounding/managing provider to assess medical issues or to refill medications.  Pt's instructed to make virtual/phone appt/s with community provider/s and notify LPRN of date and time  Pt's understand they may not leave LP to attend any medical appt's.   Pt's understand they are responsible for having a plan to refill medications and to allow time to troubleshoot.      Pt verbalizes understanding of the above criteria yes    [facility]  Nurse Liaison / CD [facility]  O: [phone]  Fax:[phone]  LPRN [place] [id number]  M-F: 7AM to 5:30PM   Sat-Sun: 7AM to 3PM  After hours: [phone]     On-going nursing intervention required?   No    Acute care visit recommended: no

## 2024-05-30 NOTE — PROGRESS NOTES
Name: Jayna Davidson  Date: 5/30/2024  Medical Record: [id number]    Envelope Number: [id number]  List of Contents (List each item separately in new row):   Venlafaxine 75 mg ER caps,   Bupropion  mg tabs.   Admission:  I am responsible for any personal items that are not sent to the safe or pharmacy.  [facility] is not responsible for loss, theft or damage of any property in my possession.    Patient Signature:  ___________________________________________       Date/Time:__________________________    Staff Signature: __________________________________       Date/Time:__________________________    [facility] Staff person, if patient is unable/unwilling to sign:      __________________________________________________________       Date/Time: __________________________    **All medications are packaged by LP staff and securely stored on [facility]. Medications left by patients at discharge will be packaged by LP staff and transported by LP staff to inpatient pharmacy for storage.**    Discharge:  [facility] has returned all of my personal belongings:    Patient Signature: ________________________________________     Date/Time: ____________________________________    Staff Signature: ______________________________________     Date/Time:_____________________________________
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
## 2024-05-30 NOTE — PROGRESS NOTES
Initial Services Plan    Before your first treatment group, please do the following    Immediate health & safety concerns: Look for sober housing and a supportive social network.  Look for a support network (such as AA, NA, DBT group, a Tenriism group, etc.)  Other: obtain a DA in the next 10 days    Suggestions for client during the time between intake & completion of treatment plan:  Tour your treatment center (unit or outpatient clinic).  Introduce yourself to the treatment group.  Spend time getting to know your peers.  Complete the problem list for your treatment plan.  Start drug and alcohol use history.  Review your patient or client handbook.    Client issues to be addressed in the first treatment sessions:  Identify motivations(s) for coming to treatment, i.e. legal, family, job, self  Identify outside concerns that may interfere with treatment, i.e. bills not getting paid, homesick for children    Ness Palm, [facility] [place]  5/30/2024

## 2024-05-30 NOTE — PROGRESS NOTES
Progress Note    This patient had a Full Comprehensive Substance Use Disorder assessment on 5/20/2024 completed by Iker ORTIZ.  This patient was seen for a face to face update of the Full Comprehensive Substance Use Disorder assessment on 5/30/2024 by ANGEL Ramos.  A scanned copy of the Full Comprehensive Substance Use Disorder assessment is in the patient's electronic medical record in Epic under the Media tab.    Alcohol/Drug use since the last CD evaluation (include date of last use):     Pt reported her last use of Methamphetamine was on 5/28/24.     Please note any other clinical changes since the last CD evaluation (such as medication changes, additional legal charges, detoxification admissions, overdoses, etc.)     No significant changes since the last CD evaluation       ASAM Dimensions Original scores Current Scores   I.) Intoxication and Withdrawal: 0 0   II.) Biomedical:  1 1   III.) Emotional and Behavioral:  1 1   IV.) Readiness to Change:  2 2   V.) Relapse Potential: 4 4   VI.) Recovery Environmental: 3 3     Please list clinical justifications for the above ASAM score changes since the original comprehensive assessment:     None of the ASAM scores on the six dimensions had changed since the Full Comprehensive Substance Use Disorder assessment was completed on 05/20/2024.       Current EDIN: Current UA:     0.000     Positive for Amphetamines and Methamphetamine and negative for all other screened drugs.       PHQ-9, ABUNDIO-7   PHQ-9 on 5/30/2024 ABUNDIO-7 on 5/30/2024   The patient's PHQ-9 score was 5 out of 27, indicating mild depression.   The patient's ABUNDIO-7 score was 4 out of 21, indicating minimal anxiety.       Lorman-Suicide Severity Rating Scale Reassessment   Have you ever wished you were dead or that you could go to sleep and not wake up?  Past Month:  No     Have you actually had any thoughts of killing yourself?  Past Month:  No     Have you been thinking about how you  might do this?     Past Month:  No   Lifetime:  No   Have you had these thoughts and had some intention of acting on them?     Past Month:  No   Lifetime:  No   Have you started to work out the details of how to kill yourself?   Past Month:  No   Lifetime:  No   Do you intend to carry out this plan?   No     When you have the thoughts how long do they last?  The patient denied ever having any suicidal thoughts in life.     Are there things - anyone or anything (i.e. family, [employer]ist, pain of death) that stopped you from wanting to die or acting on thoughts of suicide?  Does not apply       2008  [employer]  Used with permission by Jenifer Thompson, PhD.       Guide to C-SSRS Risk Ratings   NO IDEATION:  with no active thoughts IDEATION: with a wish to die. IDEATION: with active thoughts. Risk Ratings   If Yes No No 0 - Very Low Risk   If NA Yes No 1 - Low Risk   If NA Yes Yes 2 - Low/moderate risk   IDEATION: associated thoughts of methods without intent or plan INTENT: Intent to follow through on suicide PLAN: Plan to follow through on suicide Risk Ratings cont...   If Yes No No 3 - Moderate Risk   If Yes Yes No 4 - High Risk   If Yes Yes Yes 5 - High Risk   The patient's ADDITIONAL RISK FACTORS and lack of PROTECTIVE FACTORS may increase their overall suicide risk ratings.     Additional Risk Factors:   Someone close to the patient (family member/friend) completed a suicide    Significant history of having untreated or poorly treated mental health symptoms    Significant history of untreated or poorly treated chronic pain issues    Tendency to be socially isolated and/or cut off from the support of others    A recent death of someone close to the patient and/or unresolved grief and loss issues    A recent loss that was significant to the patient, i.e. loss of job, loss of home, divorce, break-up, etc.    Significant history of trauma and/or abuse issues   Protective Factors:    "Having people in his/her life that would prevent the patient from considering a suicide attempt (i.e. young children, spouse, parents, etc.)    An absence of chronic health problems or stable and well treated chronic health issues    A positive relationship with his/her clinical medical and/or mental health providers    Having easy access to supportive family members    Having a good community support network    Having restricted access to highly lethal means of suicide     Risk Status   0. - Very Low Risk:  Evaluation Counselors:  Document in Epic / SBAR to counselor \"Very Low Risk\".      Treatment Counselors:  Reassess upon admission as applicable, assess weekly in progress notes under Dimension 3 and summarize in Discharge / Treatment summary under Dimension 3.     Additional information to support suicide risk rating: There was no additional information to provide at this time.       "

## 2024-05-31 ENCOUNTER — HOSPITAL ENCOUNTER (OUTPATIENT)
Dept: BEHAVIORAL HEALTH | Facility: CLINIC | Age: 39
Discharge: HOME | End: 2024-05-31
Attending: FAMILY MEDICINE
Payer: COMMERCIAL

## 2024-05-31 PROCEDURE — 1002N00001 HC LODGING PLUS FACILITY CHARGE ADULT

## 2024-05-31 PROCEDURE — H2035 A/D TX PROGRAM, PER HOUR: HCPCS | Mod: HQ

## 2024-05-31 NOTE — GROUP NOTE
Group Therapy Documentation    PATIENT'S NAME: Jayna Davidson  MRN:   [id number]  :   1985  ACCT. NUMBER: [id number]  DATE OF SERVICE: 24  START TIME: 12:30 PM  END TIME:  2:30 PM  FACILITATOR(S): Kathy Tamayo LADC; Davin Tran LADC; Jane Salvador LADC  TOPIC: BEH Group Therapy  Number of patients attending the group:  22  Group Length:  2 Hours    Group Therapy Type: Recovery strategies and Emotion processing    Summary of Group / Topics Discussed:    Recovery Principles, Relationship/socialization, Balanced lifestyle, Mindfulness/Relaxation, and Leisure explorations/use of leisure time  Therapeutic recreational group therapy movie. Processing and discussion.      Group Attendance:  Attended group session    Patient's response to the group topic/interactions:  cooperative with task    Patient appeared to be Engaged.        Client specific details:  Patient attended PM group therapy. Patient shared appropriate feedbacks during processing/discussion.

## 2024-05-31 NOTE — PROGRESS NOTES
"Comprehensive Assessment Summary     Based on client interview, review of previous assessments and   comprehensive assessment interview the following diagnosis and recommendations are:     Patient: Jayna Davidson  MRN; [id number]   : 1985  Age: 39 year old Sex: female     The Patient's last comprehensive assessment was completed 24 at [facility].  A full copy of the previous comprehensive substance use disorder assessment is located in the patient's medical record.  Upon intake into [facility] (24), patient's risk dimensions had not changed.    Client meets criteria for:  F15.20 Stimulant Use Disorder, Amphetamine type, severe  F17.20 Tobacco Use Disorder, moderate.    Dimension One: Acute Intoxication/Withdrawal Potential     Ratin  (Consider the client's ability to cope with withdrawal symptoms and current state of intoxication)      Patient reports her drug of choice is methamphetamine with last use 24.  Patient transferred to [facility] directly from the community 24.  Patient's urinalysis gathered 24 was positive for methamphetamine and amphetamine and negative for all other screens.  Patient reports current symptoms of withdrawal include: sweating, anxiety, restlessness, fatigue, feeling, \"tingly\", irritability, and muscle aches.  Patient reports she is not taking medications to assist with withdrawal other than hydroxyzine at night.      Dimension Two: Biomedical Condition and Complications    Ratin  (Consider the degree to which any physical disorder would interfere with treatment for substance abuse, and the client's ability to tolerate any related discomfort; determine the impact of continued chemical use on the unborn child if the client is pregnant)       Patient reports recent right rotator cuff surgery and chronic pain at a current level of 3/10 where 10 indicates highest intensity of pain. Patient reported she also recently injured her left " shoulder and will need an evaluation to determine appropriate treatment.  She reports intensity of left shoulder pain is 6/10.  Patient reports some mobility restriction due to injury, specifically with dressing herself, however, reports adequate capacity to manage her own activities of daily living.  Patient denies other biomedical concerns at this time including disordered eating and sexual concerns.     Dimension Three: Emotional/Behavioral/Cognitive Conditions & Complications    Ratin  (Determine the degree to which any condition or complications are likely to interfere with treatment for substance abuse or with functioning in significant life areas and the likelihood of risk of harm to self or others)       The patient reports previous mental health diagnoses include post traumatic stress disorder, depression, and anxiety.  On 24 the patient reported a PHQ9 score of 5/27 suggesting mild symptoms of depression. The patient also reported a GAD7 score of 4/21 suggesting mild symptoms of anxiety.  Current prescribed mental health medications include Wellbutrin, Effexor, and Hydroxyzine, however she reports she stopped taking these medications the week of 24.  The patient reports a 5 year history working with a therapist at [facility] in [place], [place], and would like to continue to work with her throughout her treatment stay.  Patient declined mental health services through [facility].  Patient was informed of potential funding barriers and informed of staff willingness to assist with providing care should barriers to treatment arise.  Patient acknowledged understanding.  Patient denies current suicidal ideation and denies a history of suicidal ideation.  Patient denies a history of self injurious behavior and denies history of homicidal ideation.  Patient C-SSRS score was 0/0 24 suggesting very low risk of suicide.  Patient reports protective factors including, family member  "support, children in the home, and desire for life satisfaction.  Patient reports risk factors include: \"myself, stress, lack of communication, and not taking my medications\" as per full comprehensive assessment dated 24.  Although the patient reports mild mental health symptom presentation when prompted, she also indicates mental health symptoms are what, \"makes me use.\"  Patient reported a goal of \"get back on my meds\" and reported she has an appointment at the end of  with her prescriber.  Patient was informed that [facility] could also refer her for a medication review to re-start medications should she wish in the future.  Patient acknowledged awareness.      Patient denies any history of emotional, physical, sexual, or psychological abuse.  Additionally, she denies any history of neglect and does not endorse any significant traumatic events that currently impair functioning.  Patient reports a history of traumatic events occurred, however, she does not believe they are currently impacting her functioning.  She stated she \"smoked dope with [her] mom, but I've already worked through that.\"  Patient became tearful discussing the recent death of her step-father reporting she has not adequately processed his death.        Psychiatric hospitalization hx: none reported    Dimension Four: Treatment Acceptance/Resistance     Ratin  (Consider the amount of support and encouragement necessary to keep the client involved in treatment)       The patient reports ongoing concerns that impact her recovery including: ongoing legal involvement due to a 5th degree possession charge and related probation.  Patient denies ongoing involvement with child protective services reporting her spouse stays home with her children as she is rarely home.  The patient reports she has been concerned about finances which feeds the impulse to sell illicit substances.  She reports once she is selling, moving out of the lifestyle " "and into sobriety or recovery has been unsuccessful.  When asked about motivation for change, the client reports she is at a 7/10 with 10 indicating highest motivation for sobriety.  She relates, \"I'm feeling at peace.\"  Patient's longest period of sobriety was for 3.5 years from the ages of [age].  She reports she is externally motivated by probation and \"I feel like something bad is going to happen\" if she continues to use and distribute substances.  She reports she is internally motivated as she would like to be more emotionally present for her children and family members.  The patient reports her family is overall supportive of her desire for sobriety.  She reports her family members offer inconsistent and lack the tools and resources for sufficient support to assist in patient's sobriety efforts.  The patient reports her spouse does not currently use.  It appears the patient is in the contemplative stage of change.       Dimension Five: Continued Use/Relaspe Prevention     Ratin  (Consider the degree to which the client's recognizes relapse issues and has the skills to prevent relapse of either substance use or mental health problems)       The patient reports history of 5 previous successful treatments and 4 previous unsuccessful treatments for a total of 9 previous treatment episodes.    The patient most recently admitted to intensive outpatient programming at [facility] 24, however, was unable to abstain from use in this level of care.  Patient requested and was referred to residential level of care to provide additional support, structure, and supervision to assist patient in meeting goals for sobriety.  The patient has limited insight into consequences of use including legal involvement and relationship strain.  She indicates relapse triggers in the past have been, \"stress\" and \"boredom.\"  The patient also reports concerns related to finances have led to her selling drugs in the past. She " "reports she has been unable to abstain from use when engaged in the dealing lifestyle.  She reports inability to abstain from the dealing lifestyle.      Dimension Six: Recovery Environment     Rating:  3  (Consider the degree to which key areas of the client's life are supportive of or antagonistic to treatment participation and recovery)       Patient reports she lives with her spouse and her 2 children, ages 13 and 4.  However, patient reports she is not consistently home due to selling substances in the [facility] area for weeks at a time.  Patient reports her spouse is supportive of her goals for recovery.  Patient reports she is currently involved in the legal system due to possession charges and related probation.  She reports she has strong family support despite some ongoing substance use within her family.  Patient is not currently employed.  She reports her needs related to substance use include, \"mindfulness, getting myself back on track.\"  The patient reported moderate acceptance to suggested aftercare including sober supported housing and intensive outpatient treatment.  The patient reports some sober supportive relationships in her home community, however, reports she has not utilized these supports effectively.  The patient reports previous engagement in pow-wows, sweats, and sober support meetings in the past as her daughter has  heritage.  Patient reported she does not wish to integrate any cultural components into this treatment encounter.  Patient reports she would like to go to [facility] following residential treatment and would like to return home with her spouse and children.  Other supports include her housing worker who has been a resource and support for the past 5 years.          I have reviewed the information on the assessment, psychosocial and medical history and checklist:        it is current    Indy ORTIZ, [facility]  "

## 2024-05-31 NOTE — GROUP NOTE
Group Therapy Documentation    PATIENT'S NAME: Jayna Davidson  MRN:   [id number]  :   1985  ACCT. NUMBER: [id number]  DATE OF SERVICE: 24  START TIME:  9:00 AM  END TIME: 11:00 AM  FACILITATOR(S): Rain Self LADC; Kathy Tamayo LADC  TOPIC: BEH Group Therapy  Number of patients attending the group:  6  Group Length:  2 Hours    Group Therapy Type: Recovery strategies, Emotion processing, and Health and wellbeing     Summary of Group / Topics Discussed:    Patients checked in to primary group by describing and processing   P.I.E.S.  check-in. Each patient shared how they were feeling - physically, emotionally, spiritually description. Followed by current emotions, stating (2) positive affirmation and a gratitude statement. Several patients processed current challenges they are experiencing in their recovery process. Facilitator helped patients explore coping technics to establish with re-directive/re-framing negative thoughts.       Group Attendance:  Attended group session    Patient's response to the group topic/interactions:  cooperative with task, discussed personal experience with topic, and expressed readiness to alter behaviors    Patient appeared to be Engaged.        Client specific details:  Jayna, participated in group discussion and gave positive feedback to her peers.

## 2024-06-01 ENCOUNTER — HOSPITAL ENCOUNTER (OUTPATIENT)
Dept: BEHAVIORAL HEALTH | Facility: CLINIC | Age: 39
Discharge: HOME | End: 2024-06-01
Attending: FAMILY MEDICINE
Payer: COMMERCIAL

## 2024-06-01 PROCEDURE — H2035 A/D TX PROGRAM, PER HOUR: HCPCS | Mod: HQ

## 2024-06-01 PROCEDURE — 1002N00001 HC LODGING PLUS FACILITY CHARGE ADULT

## 2024-06-01 NOTE — GROUP NOTE
Psychoeducation Group Documentation    PATIENT'S NAME: Jayna Davidson  MRN:   [id number]  :   1985  ACCT. NUMBER: [id number]  DATE OF SERVICE: 24  START TIME:  9:00 AM  END TIME: 11:00 AM  FACILITATOR(S): Dylan Santoro LADC; Gerda Quiñnoez LADC; Diya Nixon LADC  TOPIC: BEH Pyschoeducation  Number of patients attending the group:  26  Group Length:  2 Hours    Skills Group Therapy Type: Recovery skills    Summary of Group / Topics Discussed:    Relapse prevention skills        Group Attendance:  Attended group session    Patient's response to the group topic/interactions:  cooperative with task and listened actively    Patient appeared to be Attentive.         Client specific details:  Jayna participated in the morning workshop on relapse prevention.

## 2024-06-01 NOTE — GROUP NOTE
Psychoeducation Group Documentation    PATIENT'S NAME: Jayna Davidson  MRN:   [id number]  :   1985  ACCT. NUMBER: [id number]  DATE OF SERVICE: 24  START TIME: 12:30 PM  END TIME:  2:30 PM  FACILITATOR(S): Dylan Santoro [facility]; Diya Nixon LADC; Gerda Quiñonez LADC  TOPIC: BEH Pyschoeducation  Number of patients attending the group:  26  Group Length:  2 Hours    Skills Group Therapy Type: Recovery skills    Summary of Group / Topics Discussed:    Relapse prevention skills        Group Attendance:  Attended group session    Patient's response to the group topic/interactions:  cooperative with task and listened actively    Patient appeared to be Attentive.         Client specific details:  Jayna gave appropriate feedback. .

## 2024-06-02 ENCOUNTER — HOSPITAL ENCOUNTER (OUTPATIENT)
Dept: BEHAVIORAL HEALTH | Facility: CLINIC | Age: 39
Discharge: HOME | End: 2024-06-02
Attending: FAMILY MEDICINE
Payer: COMMERCIAL

## 2024-06-02 DIAGNOSIS — F17.200 NICOTINE DEPENDENCE: Primary | ICD-10-CM

## 2024-06-02 PROCEDURE — H2035 A/D TX PROGRAM, PER HOUR: HCPCS | Mod: HQ

## 2024-06-02 PROCEDURE — 1002N00001 HC LODGING PLUS FACILITY CHARGE ADULT

## 2024-06-02 NOTE — GROUP NOTE
Psychoeducation Group Documentation    PATIENT'S NAME: Jayna Davidson  MRN:   [id number]  :   1985  ACCT. NUMBER: [id number]  DATE OF SERVICE: 24  START TIME:  8:40 AM  END TIME: 10:30 AM  FACILITATOR(S): Dylan Santoro LADC; Annelise Nunez RN; Greda Quiñonez LADC  TOPIC: BEH Pyschoeducation  Number of patients attending the group:  26  Group Length:  2 Hours    Skills Group Therapy Type: Healthy behaviors development    Summary of Group / Topics Discussed:    Relationship/social skills and Balanced lifestyle skills        Group Attendance:  Attended group session    Patient's response to the group topic/interactions:  cooperative with task    Patient appeared to be Attentive.         Client specific details:  Jayna gave appropriate feedback. .

## 2024-06-02 NOTE — GROUP NOTE
Psychoeducation Group Documentation    PATIENT'S NAME: Jayna Davidson  MRN:   [id number]  :   1985  ACCT. NUMBER: [id number]  DATE OF SERVICE: 24  START TIME: 12:30 PM  END TIME:  1:30 PM  FACILITATOR(S): Dylan Santoro LADC; Gerda Quiñonez LADC  TOPIC: BEH Pyschoeducation  Number of patients attending the group:  25  Group Length:  1 Hours    Skills Group Therapy Type: Relationship skills development    Summary of Group / Topics Discussed:    Relationship/social skills        Group Attendance:  Attended group session    Patient's response to the group topic/interactions:  cooperative with task    Patient appeared to be Attentive.         Client specific details:  Jayna gave appropriate feedback. .

## 2024-06-03 ENCOUNTER — VIRTUAL VISIT (OUTPATIENT)
Dept: ADDICTION MEDICINE | Facility: CLINIC | Age: 39
End: 2024-06-03
Payer: COMMERCIAL

## 2024-06-03 ENCOUNTER — HOSPITAL ENCOUNTER (OUTPATIENT)
Dept: BEHAVIORAL HEALTH | Facility: CLINIC | Age: 39
Discharge: HOME | End: 2024-06-03
Attending: FAMILY MEDICINE
Payer: COMMERCIAL

## 2024-06-03 VITALS — HEART RATE: 86 BPM | SYSTOLIC BLOOD PRESSURE: 154 MMHG | DIASTOLIC BLOOD PRESSURE: 98 MMHG | TEMPERATURE: 98.4 F

## 2024-06-03 DIAGNOSIS — F15.20 METHAMPHETAMINE USE DISORDER, SEVERE (H): Primary | ICD-10-CM

## 2024-06-03 DIAGNOSIS — F41.9 ANXIETY: ICD-10-CM

## 2024-06-03 DIAGNOSIS — F17.200 TOBACCO USE DISORDER, MODERATE, DEPENDENCE: ICD-10-CM

## 2024-06-03 DIAGNOSIS — F32.A DEPRESSION, UNSPECIFIED DEPRESSION TYPE: ICD-10-CM

## 2024-06-03 LAB
AMPHET UR CFM-MCNC: 2320 NG/ML
AMPHET/CREAT UR: 5524 NG/MG {CREAT}
METHAMPHET UR CFM-MCNC: ABNORMAL NG/ML
METHAMPHET/CREAT UR: ABNORMAL

## 2024-06-03 PROCEDURE — 1002N00001 HC LODGING PLUS FACILITY CHARGE ADULT

## 2024-06-03 PROCEDURE — 99443 PR PHYSICIAN TELEPHONE EVALUATION 21-30 MIN: CPT | Mod: 93 | Performed by: NURSE PRACTITIONER

## 2024-06-03 PROCEDURE — H2035 A/D TX PROGRAM, PER HOUR: HCPCS | Mod: HQ

## 2024-06-03 RX ORDER — HYDROXYZINE HYDROCHLORIDE 50 MG/1
50 TABLET, FILM COATED ORAL 3 TIMES DAILY PRN
Qty: 30 TABLET | Refills: 0 | Status: SHIPPED | OUTPATIENT
Start: 2024-06-03

## 2024-06-03 RX ORDER — NALTREXONE HYDROCHLORIDE 50 MG/1
TABLET, FILM COATED ORAL
Qty: 30 TABLET | Refills: 0 | Status: SHIPPED | OUTPATIENT
Start: 2024-06-10 | End: 2024-06-28

## 2024-06-03 RX ORDER — BUPROPION HYDROCHLORIDE 150 MG/1
TABLET ORAL
Qty: 53 TABLET | Refills: 0 | Status: SHIPPED | OUTPATIENT
Start: 2024-06-03 | End: 2024-06-28 | Stop reason: DRUGHIGH

## 2024-06-03 RX ORDER — VENLAFAXINE HYDROCHLORIDE 37.5 MG/1
37.5 CAPSULE, EXTENDED RELEASE ORAL DAILY
Qty: 7 CAPSULE | Refills: 0 | Status: SHIPPED | OUTPATIENT
Start: 2024-06-03 | End: 2024-06-28 | Stop reason: DRUGHIGH

## 2024-06-03 ASSESSMENT — PAIN SCALES - GENERAL: PAINLEVEL: MILD PAIN (3)

## 2024-06-03 NOTE — GROUP NOTE
"Group Therapy Documentation    PATIENT'S NAME: Jyana Davidson  MRN:   [id number]  :   1985  ACCT. NUMBER: [id number]  DATE OF SERVICE: 24  START TIME:  9:00 AM  END TIME: 11:00 AM  FACILITATOR(S): Kathy Tamayo LADC  TOPIC: BEH Group Therapy  Number of patients attending the group:  8  Group Length:  2 Hours    Group Therapy Type: Recovery strategies and Emotion processing    Summary of Group / Topics Discussed:    Relationship/socialization, Mindfulness/Relaxation, and Emotions/expression  Daily check-ins with QOTD: \"What problems lead you here?\"; reflection reading and discussion; assignment presentation, processing and feedbacks.      Group Attendance:  Attended group session    Patient's response to the group topic/interactions:  cooperative with task    Patient appeared to be Engaged.        Client specific details:  Patient reports feeling anxious. Patient left group multiple times to help her calm herself. Patient participated in group activity and shared appropriate feedbacks.    "

## 2024-06-03 NOTE — PROGRESS NOTES
"Virtual Visit Details    Type of service:  Telephone  Total time : 28 minutes   Originating Location (pt. Location): Other [facility]   Distant Location (provider location):  Off-site    SUBJECTIVE                                                      Jayna Davidson is a 39 year old female who presents for initial visit for addiction consultation and management referred by [facility] due to concerns for Stimulant Use Disorder.     Visit performed Virtual, via video    HPI: Jayna Davidson is a 39 year old female with history of depression, anxiety, PTSD, HTN, rotator cuff surgery, and methamphetamine use who presents for further evaluation of  substance use disorder and management options.    \"I have anxiety really bad\" \"I would like to restart Effexor and Wellbutrin\" \"I feel like I am on pins and needles\" Has follow up with PCP scheduled. Has Wellbutrin 300 mg tablets. Last taking medications 1 month ago. Well tolerated no adverse SE.     Currently in programming at [facility]. Prior to admission was taking Wellbutrin 300 mg daily and Effexor 75 mg daily, managed by PCP. She is also taking hydroxyzine 50 mg BID PRN. Admitted to  on 5/30/24. Reports last use as 5/28/24. Actually feels her withdrawal from methamphetamine is worse now than it was a week ago. Unsure how much she was using daily, \"a lot\" \"all day\" Smoking methamphetamine daily since December. Prior to that abstinence for 1 year. Endorses anxiety, irritability, fatigue. She has been able to sleep. No vivid dreams. Taking hydroxyzine and melatonin for sleep. No night time awakenings. Does endorse cravings. Working with  RN staff to schedule follow up with psychiatry. Open to Naltrexone for YARITZA.     Substance Use History:   Most recent use, past or recent hx of harmful use  ALCOHOL - denies   CANNABIS - denies  PRESCRIPTION STIMULANTS - denies  COCAINE/CRACK - denies  METH/AMPHETAMINES - daily use, last use 5/28/24.   OPIATES - denies, " "other than prescribed   BENZODIAZEPINES - denies  KRATOM - denies  KETAMINE - denies  HALLUCINOGENS (including DXM) - denies  BEHAVIORAL (Gambling, Eating d/o, Compulsivity) - denies  NICOTINE  Cigarettes: yes 1/2 pk per day   Chew/snus: denies   Vaping: occasional  Past NRT/medication use: yes       Previous withdrawal treatment episodes (e.g. detox): yes, 4/30/24 [facility], stayed for 3 days for BP management   Previous YARITZA treatment programs: 5 programs   Hospitalizations or overdose: denies   Medical complications from substance use: denies   IV Drug use?: denies   Previous Medication for Addiction Tx: denies   Longest period of full abstinence: 3.5 years  Activities that have previously supported abstinence: working   Current Recovery Activities: [facility]      Infectious disease screening  Hep C: negative per pt reports, declines screening on 6/3/24    HIV: negative per pt reports, declines screening on 6/3/24 No results found for: \"HIV\"       Pregnancy Status - not pregnant   LMP: unknown  Sexually active: yes   Birth control/barriers: IUD    Psychiatric History (per patient report and problem list review)  Past diagnoses - anxiety, depression, PTSD  Current or past psychiatrist: none, following with PCP   Current or past therapist:  Yes at [facility] in [place]   Hospitalizations/TMS/ECT - Denies   Suicide Attempts - denies   Medication trials - denies       PHQ-9 scores:      11/21/2022     2:23 PM 3/28/2024     1:00 PM 5/30/2024    12:00 PM   PHQ   PHQ-9 Total Score 9 3 5   Q9: Thoughts of better off dead/self-harm past 2 weeks Not at all Not at all Not at all     ABUNDIO-7 scores:      11/22/2022     8:00 AM 3/28/2024     1:00 PM 5/30/2024    12:00 PM   ABUNDIO-7 SCORE   Total Score 12 7 4       SOCIAL HISTORY:  Housing status: with  and children   Employment status: previously working full time, was on medical leave with no pay   Relationship status:   Children: 2 who are 13 and 3 yo   Legal concerns related to " use: probation   Contact information up to date- yes     3rd Party Involvement -  not at this time       Medical History:    Patient Active Problem List    Diagnosis Date Noted    Chemical dependency (H) 05/30/2024     Priority: Medium    Amphetamine use disorder, severe (H) 03/28/2024     Priority: Medium    Tobacco use disorder, moderate, dependence 03/28/2024     Priority: Medium       Past Medical History:   Diagnosis Date    Acute shoulder pain     Anxiety     Depressive disorder     History of hypertension     History of rotator cuff surgery     PTSD (post-traumatic stress disorder)        No past surgical history on file.      Family History   Problem Relation Age of Onset    Anxiety Disorder Mother     Depression Mother     Substance Abuse Mother     Substance Abuse Paternal Half-Brother        Current Outpatient Medications   Medication Sig Dispense Refill    buPROPion (WELLBUTRIN XL) 150 MG 24 hr tablet Take 1 tablet (150 mg) by mouth every morning for 7 days, THEN 2 tablets (300 mg) every morning for 23 days. 53 tablet 0    hydrOXYzine HCl (ATARAX) 50 MG tablet Take 1 tablet (50 mg) by mouth 3 times daily as needed for anxiety 30 tablet 0    [START ON 6/10/2024] naltrexone (DEPADE/REVIA) 50 MG tablet Take 1/2 tablet for 1 week, then increase to 1 tablet daily 30 tablet 0    venlafaxine (EFFEXOR XR) 37.5 MG 24 hr capsule Take 1 capsule (37.5 mg) by mouth daily For 1 week, then increase to 75 mg daily (pt has prescription for 75 mg XR tablets) 7 capsule 0    acetaminophen (TYLENOL) 500 MG tablet Take 500-1,000 mg by mouth every 8 hours as needed for mild pain      alum & mag hydroxide-simethicone (MAALOX) 200-200-20 MG/5ML SUSP suspension Take 30 mLs by mouth every 6 hours as needed for indigestion      benzocaine-menthol (CEPACOL) 15-3.6 MG lozenge Place 1 lozenge inside cheek every 2 hours as needed for sore throat      guaiFENesin-dextromethorphan (ROBITUSSIN DM) 100-10 MG/5ML syrup Take 10 mLs by mouth  "every 4 hours as needed for cough      ibuprofen (ADVIL/MOTRIN) 200 MG tablet Take 600 mg by mouth every 6 hours as needed for pain      loratadine (CLARITIN) 10 MG tablet Take 10 mg by mouth daily as needed for allergies      melatonin 5 MG tablet Take 5 mg by mouth nightly as needed for sleep      nicotine polacrilex (NICORETTE) 4 MG gum Place 1 each (4 mg) inside cheek as needed for nicotine withdrawal symptoms 220 each 1    senna-docusate (SENOKOT-S/PERICOLACE) 8.6-50 MG tablet Take 2 tablets by mouth daily as needed for constipation      senna-docusate (SENOKOT-S/PERICOLACE) 8.6-50 MG tablet Take 2 tablets by mouth daily as needed for constipation (Patient not taking: Reported on 11/22/2022)           No Known Allergies        OBJECTIVE                                                      EXAM    BP (!) 154/98   Pulse 86   Temp 98.4  F (36.9  C)     Physical Exam  Cardiovascular:      Rate and Rhythm: Normal rate.   Pulmonary:      Effort: Pulmonary effort is normal.   Neurological:      Mental Status: She is alert and oriented to person, place, and time.   Psychiatric:         Attention and Perception: Attention normal.         Mood and Affect: Mood is anxious.         Speech: Speech normal. Speech is not rapid and pressured.         Thought Content: Thought content normal.         Cognition and Memory: Cognition normal.         Judgment: Judgment normal.             LAB  Recent UDS Labs (may not contain today's lab data)  No results found for: \"BUP\", \"BZO\", \"BAR\", \"ANTONIA\", \"MAMP\", \"AMP\", \"MDMA\", \"MTD\", \"OII794\", \"OXY\", \"PCP\", \"THC\", \"TEMP\", \"SGPOCT\"    Hepatic Function  No results found for: \"AST\", \"ALT\", \"BILITOTAL\", \"ALBUMIN\", \"INR\"    CBC  No results found for: \"WBC\", \"RBC\", \"HGB\", \"HCT\", \"MCV\", \"MCH\", \"MCHC\", \"PLT\", \"RDW\"    Today's lab data  No results found for any visits on 06/03/24.        [facility] Board  Pharmacy Data Base Reviewed;  Consistent with patient reports and Epic records.   "         A/P                                                      ASSESSMENT/PLAN:  1. Methamphetamine use disorder, severe (H)  - pt presents for initial visit referred from  for h/o severe methamphetamine use disorder. Currently endorses symptoms consistent with methamphetamine withdrawal, ~ 1 week since last use. Resuming below medications for mood which have previously improved symptoms. Today pt is afebrile, no tachycardia.   - Discussed recommendation for pharmacotherapy in combination with psychosocial interventions to treat YARITZA. Plan to start Wellbutrin + Naltrexone. Titrate Wellbutrin back to PTA dose of 300 mg daily. Titrate Naltrexone to 50 mg daily. Reviewed MOA and common SE   - Continue programming at    - buPROPion (WELLBUTRIN XL) 150 MG 24 hr tablet; Take 1 tablet (150 mg) by mouth every morning for 7 days, THEN 2 tablets (300 mg) every morning for 23 days.  Dispense: 53 tablet; Refill: 0  - naltrexone (DEPADE/REVIA) 50 MG tablet; Take 1/2 tablet for 1 week, then increase to 1 tablet daily  Dispense: 30 tablet; Refill: 0    2. Tobacco use disorder, moderate, dependence  - smoking 1/2 pk per day, resuming wellbutrin for depression as below, also taking to treat YARITZA as above.   - continue NRT prn while at    - Continue to assess readiness   - buPROPion (WELLBUTRIN XL) 150 MG 24 hr tablet; Take 1 tablet (150 mg) by mouth every morning for 7 days, THEN 2 tablets (300 mg) every morning for 23 days.  Dispense: 53 tablet; Refill: 0    3. Depression, unspecified depression type  Resume previous effective and well tolerated medications as below. Has been off treatment for 1 month, will titrate back to PTA doses. Has follow up with PCP for long term management.   - Referral in place for psychiatry per [facility] RN staff   - Established with individual psychiatry as well   - buPROPion (WELLBUTRIN XL) 150 MG 24 hr tablet; Take 1 tablet (150 mg) by mouth every morning for 7 days, THEN 2 tablets (300 mg) every  morning for 23 days.  Dispense: 53 tablet; Refill: 0  - venlafaxine (EFFEXOR XR) 37.5 MG 24 hr capsule; Take 1 capsule (37.5 mg) by mouth daily For 1 week, then increase to 75 mg daily (pt has prescription for 75 mg XR tablets)  Dispense: 7 capsule; Refill: 0    4. Anxiety  Resume previous effective and well tolerated medications as below. Has been off treatment for 1 month, will titrate back to PTA doses. Has follow up with PCP for long term management.   - Referral in place for psychiatry per [facility] RN staff   - Established with individual psychiatry as well   - hydrOXYzine HCl (ATARAX) 50 MG tablet; Take 1 tablet (50 mg) by mouth 3 times daily as needed for anxiety  Dispense: 30 tablet; Refill: 0  - venlafaxine (EFFEXOR XR) 37.5 MG 24 hr capsule; Take 1 capsule (37.5 mg) by mouth daily For 1 week, then increase to 75 mg daily (pt has prescription for 75 mg XR tablets)  Dispense: 7 capsule; Refill: 0         Continued Complex Management  The longitudinal plan of care for Stimulant Use Disorder was addressed during this visit. Due to the added complexity in care, I will continue to support Jayna in the subsequent management and with ongoing continuity of care.       Counseled the patient on the importance of having a recovery program in addition to medication to manage recovery.  Components include avoiding isolating, having willingness to change, avoiding triggers and managing cravings. Encouraged having some type of sober network and practicing honesty with trusted support person(s). Encouraged other services such as counseling, 12 step or other self-help organizations.        RTC   1-2 weeks     DANIELLE Huynh [facility]  [phone]

## 2024-06-03 NOTE — GROUP NOTE
Group Therapy Documentation    PATIENT'S NAME: Jayna Davidson  MRN:   [id number]  :   1985  ACCT. NUMBER: [id number]  DATE OF SERVICE: 24  START TIME: 12:30 PM  END TIME:  2:30 PM  FACILITATOR(S): Indy Hernández LADC  TOPIC: BEH Group Therapy  Number of patients attending the group:  7  Group Length:  2 Hours    Group Therapy Type: Recovery strategies and Emotion processing    Summary of Group / Topics Discussed:    Sober coping skills, Cognitive behavioral therapy skills, Co-occurring illnesses symptom management, and Relapse prevention      Group Attendance:  Excused from group session    Patient's response to the group topic/interactions:  refused to participate.    Patient appeared to be Non-participatory.        Client specific details:  Patient was not present in group due to discomfort related to withdrawal.  Counselor reported patient is excused.

## 2024-06-03 NOTE — NURSING NOTE
Is the patient currently in the state of [place]? YES    Visit mode:TELEPHONE    If the visit is dropped, the patient can be reconnected by: TELEPHONE VISIT: Phone number:     Call [phone]     Nurse Annelise and patient will be on speaker phone    Will anyone else be joining the visit? NO  (If patient encounters technical issues they should call [phone])    How would you like to obtain your AVS? MyChart    Are changes needed to the allergy or medication list? Pt stated no changes to allergies     Not able to verify meds with [facility].    Are refills needed on medications prescribed by this physician? NO    Patient is requesting med refills.    Reason for visit: Consult    Not able to complete any questionnaires.    Beth LYNN

## 2024-06-04 ENCOUNTER — HOSPITAL ENCOUNTER (OUTPATIENT)
Dept: BEHAVIORAL HEALTH | Facility: CLINIC | Age: 39
Discharge: HOME | End: 2024-06-04
Attending: FAMILY MEDICINE
Payer: COMMERCIAL

## 2024-06-04 PROCEDURE — H2035 A/D TX PROGRAM, PER HOUR: HCPCS | Mod: HQ

## 2024-06-04 NOTE — GROUP NOTE
Group Therapy Documentation    PATIENT'S NAME: Jayna Davidson  MRN:   [id number]  :   1985  ACCT. NUMBER: [id number]  DATE OF SERVICE: 24  START TIME:  9:00 AM  END TIME: 11:00 AM  FACILITATOR(S): Kathy Tamayo LADC; Dylan Santoro LADC  TOPIC: BEH Group Therapy  Number of patients attending the group:  7  Group Length:  2 Hours    Group Therapy Type: Recovery strategies and Emotion processing    Summary of Group / Topics Discussed:    Recovery Principles, Relationship/socialization, Mindfulness/Relaxation, and Emotions/expression      Group Attendance:  Attended group session    Patient's response to the group topic/interactions:  cooperative with task    Patient appeared to be Engaged.        Client specific details:  Patient attended AM group. Patient shared appropriate feedbacks during discussion.

## 2024-06-04 NOTE — PROGRESS NOTES
"CHEMICAL DEPENDENCY DISCHARGE SUMMARY   NAME: Jayna Davidson  : 1985  MR # [id number]    EVALUATION COUNSELOR: Navjot ORTIZ  TREATMENT COUNSELOR:  ANGEL Talbot & ANGEL Neff    REFERRAL SOURCE:  Self/Assessment  PROGRAM:  [facility]    ADMISSION DATE:  2024   LAST SESSION DATE: 6/3/2024  DISCHARGE DATE: 2024    ADMISSION DIAGNOSIS:   F15.20 Stimulant Use Disorder, Moderate/Severe       DISCHARGE DIAGNOSIS:   F15.20 Stimulant Use Disorder, Moderate/Severe     DISCHARGE STATUS: Patient discharged AMA.    LAST USE DATE:  2024  DAYS OF TREATMENT COMPLETED:  5     PRESENTING INFORMATION:   From EHR: Patient presented at admission as a direct admit. Patient was assessed during admission for Stimulant Use Disorder, completed comprehensive assessment at [facility] by ANGEL Pradhan.  Patient reports exacerbated mental health due to continued misuse. Patient reports longest period of sobriety was for 3.5 years and has not been medication compliant. Patient lacks sober coping skills and refusal skills for relapse prevention. Patient is at high risk for relapse if she continues with addictive lifestyle. Patient has legal involvement and has P.O. at [facility].     Patient did not attend programing other than orientation, six group lecture and left within 5 days. Therefore, treatment plan was not completed as patient was not here long enough to reassess risk ratings of dimensions. Patient left at  against staff advice stating that \"I would want to be there for my 4 year old child\", \"I will contact the  and will return to the outpatient that I was supposed to go\", \"I am sober now and I am taking my medications.\" Patient allowed to return after 30 days with STAFF CONSULT.       3. Risk ratings from CD evaluation completed by ANGEL Pradhan on 2024 are as follows:    D1: 0  D2: 1  D3: 1  D4: " 2  D5: 4  D6: 3    This information has been disclosed to you from records protected by Federal confidentiality rules (42 CFR part 2). The Federal rules prohibit you from making any further disclosure of this information unless further disclosure is expressly permitted by the written consent of the person to whom it pertains or as otherwise permitted by 42 CFR part 2. A general authorization for the release of medical or other information is NOT sufficient for this purpose. The Federal rules restrict any use of the information to criminally investigate or prosecute any alcohol or drug abuse patient.

## 2024-06-04 NOTE — PROGRESS NOTES
Pt reported that she still feels like she wants to leave today, but dies feel better than yesterday. Pt reported feeling anxious and antsy this am so she took her PRN hydroxyzine. Pt then felt to tired to stay in group/ Pt said she would rest for 40 min than return to group, writer okayed this plan

## 2024-06-04 NOTE — PROGRESS NOTES
Patient left treatment AM on this day.   Patient packed up her belongings, and received back any stored items and all her medications.   Patient declined the need for resources.   Patient had contacted a ride to pick her up and bring her back to [place] to attend [facility].       ANGEL Talbot

## 2024-06-28 ENCOUNTER — VIRTUAL VISIT (OUTPATIENT)
Dept: ADDICTION MEDICINE | Facility: CLINIC | Age: 39
End: 2024-06-28
Payer: COMMERCIAL

## 2024-06-28 DIAGNOSIS — F41.9 ANXIETY: ICD-10-CM

## 2024-06-28 DIAGNOSIS — F32.A DEPRESSION, UNSPECIFIED DEPRESSION TYPE: ICD-10-CM

## 2024-06-28 DIAGNOSIS — F15.20 METHAMPHETAMINE USE DISORDER, SEVERE (H): Primary | ICD-10-CM

## 2024-06-28 PROCEDURE — G2211 COMPLEX E/M VISIT ADD ON: HCPCS | Mod: 95 | Performed by: NURSE PRACTITIONER

## 2024-06-28 PROCEDURE — 99213 OFFICE O/P EST LOW 20 MIN: CPT | Mod: 95 | Performed by: NURSE PRACTITIONER

## 2024-06-28 RX ORDER — VENLAFAXINE HYDROCHLORIDE 75 MG/1
150 CAPSULE, EXTENDED RELEASE ORAL DAILY
COMMUNITY
Start: 2024-03-11 | End: 2025-03-11

## 2024-06-28 RX ORDER — NALTREXONE HYDROCHLORIDE 50 MG/1
TABLET, FILM COATED ORAL
Qty: 60 TABLET | Refills: 0 | Status: SHIPPED | OUTPATIENT
Start: 2024-06-28 | End: 2024-07-26

## 2024-06-28 RX ORDER — BUPROPION HYDROCHLORIDE 300 MG/1
300 TABLET ORAL EVERY MORNING
COMMUNITY
Start: 2022-12-28 | End: 2025-03-11

## 2024-06-28 RX ORDER — DIAZEPAM 5 MG
5 TABLET ORAL ONCE
COMMUNITY
Start: 2024-05-20 | End: 2024-07-26

## 2024-06-28 NOTE — NURSING NOTE
Is the patient currently in the state of [place]? YES    Visit mode:VIDEO    If the visit is dropped, the patient can be reconnected by: VIDEO VISIT: Text to cell phone:   Telephone Information:   Mobile [phone]       Will anyone else be joining the visit? No  (If patient encounters technical issues they should call [phone])    How would you like to obtain your AVS? MyChart    Are changes needed to the allergy or medication list? No    Rooming Documentation: Assigned questionnaire(s) completed .    Reason for visit: RECHLEAH [place]

## 2024-06-28 NOTE — PROGRESS NOTES
Virtual Visit Details    Type of service:  Video Visit   Video Start Time: 8:32 AM  Video End Time: 8:49 AM  Originating Location (pt. Location): Home  Distant Location (provider location):  On-site  Platform used for Video Visit: [facility]    A/P                                                    ASSESSMENT/PLAN  1. Methamphetamine use disorder, severe (H)  Controlled. Early remission. Confirms date of last use as 5/28/24.   Discharged from [facility], now in outpatient programming at [facility] in [place], [place]   Reviewed recommendation for pharmacotherapy in combination with psychosocial interventions,continue Wellbutrin  mg + Naltrexone 50 mg. Titrate Naltrexone to 50 mg daily. Reviewed MOA and common SE   Strong recovery supports: therapy, meetings, family, outpatient programming   Additional patient counseling as below   Monitor use/cravings   - Will sign ANABEL for [facility] for UDS results since patient will be seen virtually   - UDS results reviewed from 6/10/24   - naltrexone (DEPADE/REVIA) 50 MG tablet; Take 1/2 tablet for 5 day, then increase to 1 tablet daily  Dispense: 60 tablet; Refill: 0    2. Depression, unspecified depression type  3. Anxiety  - improved. Continue all medications as prescribed per PCP. Medication list updated today. Wellbutrin  mg/day, Venlafaxine XR 75 mg/day, Hydroxyzine 50 mg TID PRN.   - continue individual therapy          Continued Complex Management  The longitudinal plan of care for Methamphetamine use disorder severe was addressed during this visit. Due to the added complexity in care, I will continue to support Jayna in the subsequent management and with ongoing continuity of care.      Last encounter A/P 6/3/24  1. Methamphetamine use disorder, severe (H)  - pt presents for initial visit referred from [facility] for h/o severe methamphetamine use disorder. Currently endorses symptoms consistent with  methamphetamine withdrawal, ~ 1 week since last use. Resuming below medications for mood which have previously improved symptoms. Today pt is afebrile, no tachycardia.   - Discussed recommendation for pharmacotherapy in combination with psychosocial interventions to treat YARITZA. Plan to start Wellbutrin + Naltrexone. Titrate Wellbutrin back to PTA dose of 300 mg daily. Titrate Naltrexone to 50 mg daily. Reviewed MOA and common SE   - Continue programming at    - buPROPion (WELLBUTRIN XL) 150 MG 24 hr tablet; Take 1 tablet (150 mg) by mouth every morning for 7 days, THEN 2 tablets (300 mg) every morning for 23 days.  Dispense: 53 tablet; Refill: 0  - naltrexone (DEPADE/REVIA) 50 MG tablet; Take 1/2 tablet for 1 week, then increase to 1 tablet daily  Dispense: 30 tablet; Refill: 0     2. Tobacco use disorder, moderate, dependence  - smoking 1/2 pk per day, resuming wellbutrin for depression as below, also taking to treat YARITZA as above.   - continue NRT prn while at    - Continue to assess readiness   - buPROPion (WELLBUTRIN XL) 150 MG 24 hr tablet; Take 1 tablet (150 mg) by mouth every morning for 7 days, THEN 2 tablets (300 mg) every morning for 23 days.  Dispense: 53 tablet; Refill: 0     3. Depression, unspecified depression type  Resume previous effective and well tolerated medications as below. Has been off treatment for 1 month, will titrate back to PTA doses. Has follow up with PCP for long term management.   - Referral in place for psychiatry per  RN staff   - Established with individual psychiatry as well   - buPROPion (WELLBUTRIN XL) 150 MG 24 hr tablet; Take 1 tablet (150 mg) by mouth every morning for 7 days, THEN 2 tablets (300 mg) every morning for 23 days.  Dispense: 53 tablet; Refill: 0  - venlafaxine (EFFEXOR XR) 37.5 MG 24 hr capsule; Take 1 capsule (37.5 mg) by mouth daily For 1 week, then increase to 75 mg daily (pt has prescription for 75 mg XR tablets)  Dispense: 7 capsule; Refill: 0     4.  Anxiety  Resume previous effective and well tolerated medications as below. Has been off treatment for 1 month, will titrate back to PTA doses. Has follow up with PCP for long term management.   - Referral in place for psychiatry per [facility] RN staff   - Established with individual psychiatry as well   - hydrOXYzine HCl (ATARAX) 50 MG tablet; Take 1 tablet (50 mg) by mouth 3 times daily as needed for anxiety  Dispense: 30 tablet; Refill: 0  - venlafaxine (EFFEXOR XR) 37.5 MG 24 hr capsule; Take 1 capsule (37.5 mg) by mouth daily For 1 week, then increase to 75 mg daily (pt has prescription for 75 mg XR tablets)  Dispense: 7 capsule; Refill: 0      PDMP Review         Value Time User    State PDMP site checked  Yes 6/28/2024  8:53 AM Zeny Nicholas APRN CNP              RTC  Return in about 4 weeks (around 7/26/2024) for Follow up, with me, using a video visit.      Counseled the patient on the importance of having a recovery program in addition to medication to manage recovery.  Components include avoiding isolating, having willingness to change, avoiding triggers and managing cravings. Encouraged having some type of sober network and practicing honesty with trusted support person(s). Encouraged other services such as counseling, 12 step or other self-help organizations.        SUBJECTIVE                                                      HPI: Jayna Davidson is a 39 year old female with history of depression, anxiety, PTSD, HTN, rotator cuff surgery, and methamphetamine use who presents for follow up.     Brief Hx:    Initial visit on 6/3/24, was in programming at [facility] at the time.  Admitted to  on 5/30/24. Reports last use as 5/28/24. H/o of daily methamphetamine use via inhalation since December 2023. Prior to that abstinence for 1 year. + cravings. Wellbutrin and Effexor resumed for anxiety and depression, established with PCP for long term management. Naltrexone + Wellbutrin started for StUD.  Discharged from  and started outpatient programming in [place] at [facility].      Substance Use History:   Most recent use, past or recent hx of harmful use  ALCOHOL - denies   CANNABIS - denies  PRESCRIPTION STIMULANTS - denies  COCAINE/CRACK - denies  METH/AMPHETAMINES - daily use, last use 5/28/24.   OPIATES - denies, other than prescribed   BENZODIAZEPINES - denies  KRATOM - denies  KETAMINE - denies  HALLUCINOGENS (including DXM) - denies  BEHAVIORAL (Gambling, Eating d/o, Compulsivity) - denies  NICOTINE  Cigarettes: yes 1/2 pk per day   Chew/snus: denies   Vaping: occasional  Past NRT/medication use: yes         Previous withdrawal treatment episodes (e.g. detox): yes, 4/30/24 [place], stayed for 3 days for BP management   Previous YARITZA treatment programs: 5 programs   Hospitalizations or overdose: denies   Medical complications from substance use: denies   IV Drug use?: denies   Previous Medication for Addiction Tx: denies   Longest period of full abstinence: 3.5 years  Activities that have previously supported abstinence: working   Current Recovery Activities:         Infectious disease screening  Hep C: negative per pt reports, declines screening on 6/3/24    HIV: negative per pt reports, declines screening on 6/3/24      Psychiatric History (per patient report and problem list review)  Past diagnoses - anxiety, depression, PTSD  Current or past psychiatrist: none, following with PCP   Current or past therapist:  Yes at [facility] in [place]   Hospitalizations/TMS/ECT - Denies   Suicide Attempts - denies   Medication trials - denies      SOCIAL HISTORY:  Housing status: with  and children   Employment status: previously working full time, was on medical leave with no pay   Relationship status:   Children: 2 who are 13 and 3 yo   Legal concerns related to use: probation   Contact information up to date- yes        TODAY'S VISIT  HPI Jun 28, 2024  - she was never able to start Naltrexone  prior to leaving   - no use since 05/28/24   - back home with children which has been nice, back in routine   - minimal cravings  - no environmental triggers  - continues outpatient programming [facility]   - she does plan to attending meetings as well, prefers AA meetings.   - she is currently on probation, calling in daily for color wheel   - No CPS involvement, she has sole custody   - Living in [place], good environment for her   - continues individual therapy   - MRI in July, h/o motion sickness and claustrophobia, prescribed 5 mg valium tablet for this   - no opioid use   - has continued Wellbutrin 300 mg and Effexor 75 mg, managed per PCP       OBJECTIVE  PHYSICAL EXAM:  There were no vitals taken for this visit.    GENERAL: healthy, alert and no distress  RESP: No respiratory distress  MENTAL STATUS EXAM  Appearance/Behavior: No appearant distress and Neatly groomed  Speech: Normal  Mood/Affect: normal affect  Insight: Adequate      PHQ-9 Score:       11/21/2022     2:23 PM 3/28/2024     1:00 PM 5/30/2024    12:00 PM   PHQ   PHQ-9 Total Score 9 3 5   Q9: Thoughts of better off dead/self-harm past 2 weeks Not at all Not at all Not at all       ABUNDIO-7 Score:      11/22/2022     8:00 AM 3/28/2024     1:00 PM 5/30/2024    12:00 PM   ABUNDIO-7 SCORE   Total Score 12 7 4       LABS (may not contain today's labs)                                                        Today's lab data  No results found for any visits on 06/28/24.        HISTORY                                                    Problem list reviewed & adjusted, as indicated.  Patient Active Problem List   Diagnosis    Methamphetamine use disorder, severe (H)    Tobacco use disorder, moderate, dependence    Chemical dependency (H)         MEDICATION LIST (prior to visit)  Current Outpatient Medications   Medication Sig Dispense Refill    buPROPion (WELLBUTRIN XL) 300 MG 24 hr tablet Take 300 mg by mouth every morning      diazepam (VALIUM) 5 MG  tablet Take 5 mg by mouth once Take one tab 30 minutes prior to MRI, ok to take second as needed for anxiety      naltrexone (DEPADE/REVIA) 50 MG tablet Take 1/2 tablet for 5 day, then increase to 1 tablet daily 60 tablet 0    venlafaxine (EFFEXOR XR) 75 MG 24 hr capsule Take 75 mg by mouth daily      acetaminophen (TYLENOL) 500 MG tablet Take 500-1,000 mg by mouth every 8 hours as needed for mild pain      benzocaine-menthol (CEPACOL) 15-3.6 MG lozenge Place 1 lozenge inside cheek every 2 hours as needed for sore throat      hydrOXYzine HCl (ATARAX) 50 MG tablet Take 1 tablet (50 mg) by mouth 3 times daily as needed for anxiety 30 tablet 0    ibuprofen (ADVIL/MOTRIN) 200 MG tablet Take 600 mg by mouth every 6 hours as needed for pain      loratadine (CLARITIN) 10 MG tablet Take 10 mg by mouth daily as needed for allergies      melatonin 5 MG tablet Take 5 mg by mouth nightly as needed for sleep      nicotine polacrilex (NICORETTE) 4 MG gum Place 1 each (4 mg) inside cheek as needed for nicotine withdrawal symptoms 220 each 1    senna-docusate (SENOKOT-S/PERICOLACE) 8.6-50 MG tablet Take 2 tablets by mouth daily as needed for constipation       No current facility-administered medications for this visit.     Facility-Administered Medications Ordered in Other Visits   Medication Dose Route Frequency Provider Last Rate Last Admin    Self Administer Medications: Behavioral Services   Does not apply See Admin Instructions Noa Burch MD        Self Administer Medications: Behavioral Services   Does not apply See Admin Instructions Navjot Manzano MD        Self Administer Medications: Behavioral Services   Does not apply See Admin Instructions Bowen Vazquez MD           MEDICATION LIST (after visit)  Current Outpatient Medications   Medication Sig Dispense Refill    buPROPion (WELLBUTRIN XL) 300 MG 24 hr tablet Take 300 mg by mouth every morning      diazepam (VALIUM) 5 MG tablet Take 5 mg by mouth once  Take one tab 30 minutes prior to MRI, ok to take second as needed for anxiety      naltrexone (DEPADE/REVIA) 50 MG tablet Take 1/2 tablet for 5 day, then increase to 1 tablet daily 60 tablet 0    venlafaxine (EFFEXOR XR) 75 MG 24 hr capsule Take 75 mg by mouth daily      acetaminophen (TYLENOL) 500 MG tablet Take 500-1,000 mg by mouth every 8 hours as needed for mild pain      benzocaine-menthol (CEPACOL) 15-3.6 MG lozenge Place 1 lozenge inside cheek every 2 hours as needed for sore throat      hydrOXYzine HCl (ATARAX) 50 MG tablet Take 1 tablet (50 mg) by mouth 3 times daily as needed for anxiety 30 tablet 0    ibuprofen (ADVIL/MOTRIN) 200 MG tablet Take 600 mg by mouth every 6 hours as needed for pain      loratadine (CLARITIN) 10 MG tablet Take 10 mg by mouth daily as needed for allergies      melatonin 5 MG tablet Take 5 mg by mouth nightly as needed for sleep      nicotine polacrilex (NICORETTE) 4 MG gum Place 1 each (4 mg) inside cheek as needed for nicotine withdrawal symptoms 220 each 1    senna-docusate (SENOKOT-S/PERICOLACE) 8.6-50 MG tablet Take 2 tablets by mouth daily as needed for constipation       No current facility-administered medications for this visit.     Facility-Administered Medications Ordered in Other Visits   Medication Dose Route Frequency Provider Last Rate Last Admin    Self Administer Medications: Behavioral Services   Does not apply See Admin Instructions Noa Burch MD        Self Administer Medications: Behavioral Services   Does not apply See Admin Instructions Navjot Manzano MD        Self Administer Medications: Behavioral Services   Does not apply See Admin Instructions Bowen Vazquez MD           No Known Allergies    DANIELLE Huyn[facility]  [phone]

## 2024-07-26 ENCOUNTER — VIRTUAL VISIT (OUTPATIENT)
Dept: ADDICTION MEDICINE | Facility: CLINIC | Age: 39
End: 2024-07-26
Payer: COMMERCIAL

## 2024-07-26 DIAGNOSIS — F17.200 TOBACCO USE DISORDER, MODERATE, DEPENDENCE: ICD-10-CM

## 2024-07-26 DIAGNOSIS — F15.20 METHAMPHETAMINE USE DISORDER, SEVERE (H): Primary | ICD-10-CM

## 2024-07-26 DIAGNOSIS — F32.A DEPRESSION, UNSPECIFIED DEPRESSION TYPE: ICD-10-CM

## 2024-07-26 PROCEDURE — 99214 OFFICE O/P EST MOD 30 MIN: CPT | Mod: 95 | Performed by: NURSE PRACTITIONER

## 2024-07-26 PROCEDURE — G2211 COMPLEX E/M VISIT ADD ON: HCPCS | Mod: 95 | Performed by: NURSE PRACTITIONER

## 2024-07-26 RX ORDER — NALTREXONE HYDROCHLORIDE 50 MG/1
50 TABLET, FILM COATED ORAL DAILY
Qty: 60 TABLET | Refills: 0 | Status: SHIPPED | OUTPATIENT
Start: 2024-07-26 | End: 2024-08-30 | Stop reason: SINTOL

## 2024-07-26 NOTE — NURSING NOTE
Current patient location: [address] [place] [address] [address]  [facility] [address]    Is the patient currently in the state of [place]? YES    Visit mode:VIDEO    If the visit is dropped, the patient can be reconnected by: VIDEO VISIT: Text to cell phone:   Telephone Information:   Mobile [phone]       Will anyone else be joining the visit? NO  (If patient encounters technical issues they should call [phone])    How would you like to obtain your AVS? MyChart    Are changes needed to the allergy or medication list? Pt stated no changes to allergies and Pt stated no med changes    Are refills needed on medications prescribed by this physician? NO    Reason for visit: RECHCECELIA TEJEDAF

## 2024-07-26 NOTE — PROGRESS NOTES
Virtual Visit Details  Type of service:  Video Visit   Video Start Time: 8:30 AM  Video End Time:8:49 AM  Originating Location (pt. Location): Home  Distant Location (provider location):  On-site  Platform used for Video Visit: [employer] [facility]    A/P                                                    ASSESSMENT/PLAN  1. Methamphetamine use disorder, severe (H)  Overall well controlled. Intermittent cravings, discussed avoidance of environmental triggers.   Started Naltrexone today due to delay in filling at pharmacy. Will monitor for symptom improvement and SE.   Continue Wellbutrin 300 mg daily (prescribed by PCP)   Continue outpatient programming and meetings   Continue individual therapy   - reviewed urine drug screen from 7/22/24, negative for all substances   - naltrexone (DEPADE/REVIA) 50 MG tablet; Take 1 tablet (50 mg) by mouth daily  Dispense: 60 tablet; Refill: 0    2. Tobacco use disorder, moderate, dependence  - quit smoking 7/22/24, acknowledged this accomplishment.   - Continue Wellbutrin 300 mg daily (prescribed by PCP)     3. Depression, unspecified depression type  Stable.   Continue all medications as prescribed per PCP. Wellbutrin  mg/day, and Venlafaxine XR 75 mg/day.   - continue individual therapy        Continued Complex Management  The longitudinal plan of care for Stimulant Use Disorder was addressed during this visit. Due to the added complexity in care, I will continue to support Jayna in the subsequent management and with ongoing continuity of care.      Last encounter A/P  1. Methamphetamine use disorder, severe (H)  Controlled. Early remission. Confirms date of last use as 5/28/24.   Discharged from [facility], now in outpatient programming at [facility] in [place], [place]   Reviewed recommendation for pharmacotherapy in combination with psychosocial interventions,continue Wellbutrin  mg + Naltrexone 50 mg. Titrate Naltrexone to 50  mg daily. Reviewed MOA and common SE   Strong recovery supports: therapy, meetings, family, outpatient programming   Additional patient counseling as below   Monitor use/cravings   - Will sign ANABEL for [facility] for UDS results since patient will be seen virtually   - UDS results reviewed from 6/10/24   - naltrexone (DEPADE/REVIA) 50 MG tablet; Take 1/2 tablet for 5 day, then increase to 1 tablet daily  Dispense: 60 tablet; Refill: 0     2. Depression, unspecified depression type  3. Anxiety  - improved. Continue all medications as prescribed per PCP. Medication list updated today. Wellbutrin  mg/day, Venlafaxine XR 75 mg/day, Hydroxyzine 50 mg TID PRN.   - continue individual therapy       PDMP Review         Value Time User    State PDMP site checked  Yes 6/28/2024  8:53 AM Zeny Nicholas APRN CNP          RTC  Return in about 4 weeks (around 8/23/2024) for Follow up, with me, using a video visit.    Counseled the patient on the importance of having a recovery program in addition to medication to manage recovery.  Components include avoiding isolating, having willingness to change, avoiding triggers and managing cravings. Encouraged having some type of sober network and practicing honesty with trusted support person(s). Encouraged other services such as counseling, 12 step or other self-help organizations.      SUBJECTIVE                                                      HPI: Jayna Davidson is a 39 year old female with history of depression, anxiety, PTSD, HTN, rotator cuff surgery, and methamphetamine use who presents for follow up.      Brief Hx:    Initial visit on 6/3/24, was in programming at [facility] at the time.  Admitted to  on 5/30/24. Reports last use as 5/28/24. H/o of daily methamphetamine use via inhalation since December 2023. Prior to that abstinence for 1 year. + cravings. Wellbutrin and Effexor resumed for anxiety and depression, established with PCP for long term  management. Naltrexone + Wellbutrin started for StUD. Discharged from  and started outpatient programming in [place] at [facility].      Substance Use History:   Most recent use, past or recent hx of harmful use  ALCOHOL - denies   CANNABIS - denies  PRESCRIPTION STIMULANTS - denies  COCAINE/CRACK - denies  METH/AMPHETAMINES - daily use, last use 5/28/24.   OPIATES - denies, other than prescribed   BENZODIAZEPINES - denies  KRATOM - denies  KETAMINE - denies  HALLUCINOGENS (including DXM) - denies  BEHAVIORAL (Gambling, Eating d/o, Compulsivity) - denies  NICOTINE  Cigarettes: yes 1/2 pk per day   Chew/snus: denies   Vaping: occasional  Past NRT/medication use: yes         Previous withdrawal treatment episodes (e.g. detox): yes, 4/30/24 [place], stayed for 3 days for BP management   Previous [facility] treatment programs: 5 programs   Hospitalizations or overdose: denies   Medical complications from substance use: denies   IV Drug use?: denies   Previous Medication for Addiction Tx: denies   Longest period of full abstinence: 3.5 years  Activities that have previously supported abstinence: working   Current Recovery Activities:         Infectious disease screening  Hep C: negative per pt reports, declines screening on 6/3/24    HIV: negative per pt reports, declines screening on 6/3/24      Psychiatric History (per patient report and problem list review)  Past diagnoses - anxiety, depression, PTSD  Current or past psychiatrist: none, following with PCP   Current or past therapist:  Yes at [facility] in [place]   Hospitalizations/TMS/ECT - Denies   Suicide Attempts - denies   Medication trials - denies      SOCIAL HISTORY:  Housing status: with  and children   Employment status: previously working full time, was on medical leave with no pay   Relationship status:   Children: 2 who are 13 and 5 yo   Legal concerns related to use: probation   Contact information up to date- yes        Recent HPI Details:  Guillermo  28, 2024  - she was never able to start Naltrexone prior to leaving   - no use since 05/28/24   - back home with children which has been nice, back in routine   - minimal cravings  - no environmental triggers  - continues outpatient programming [facility]   - she does plan to attending meetings as well, prefers  meetings.   - she is currently on probation, calling in daily for color wheel   - No CPS involvement, she has sole custody   - Living in [place], good environment for her   - continues individual therapy   - MRI in July, h/o motion sickness and claustrophobia, prescribed 5 mg valium tablet for this   - no opioid use   - has continued Wellbutrin 300 mg and Effexor 75 mg, managed per PCP     TODAY'S VISIT  HPI Jul 26, 2024  - quit smoking on Monday!   - has had some triggers recently, trying to be very self aware. Overall cravings well controlled. Was recently back in [place], a place where she previously use to use. Was able to talk o her dad about this and reflect on environmental triggers.   - using coping skills   - almost 2 months remission from methamphetamine use.   - was not able to start Naltrexone because her prescription was actually previously filled at [place]  - outpatient treatment 3 days per week   - [facility] on Tuesdays meeting   - focusing on self care   - positive impacts of sobriety on her relationship with children   - she did not have to take Valium for MRI, left shoulder full thickness tendon tear. Will have to have surgery.   - taking hydroxyzine only as needed   - continues Wellbutrin and venlafaxine unchanged     OBJECTIVE  PHYSICAL EXAM:  There were no vitals taken for this visit.    Physical Exam  Constitutional:       General: She is not in acute distress.     Appearance: Normal appearance.   Eyes:      Extraocular Movements: Extraocular movements intact.   Pulmonary:      Effort: Pulmonary effort is normal.   Neurological:      Mental Status: She is alert  and oriented to person, place, and time.   Psychiatric:         Attention and Perception: Attention and perception normal.         Mood and Affect: Mood and affect normal. Mood is not anxious or depressed.         Speech: Speech normal.         Behavior: Behavior is cooperative.         Thought Content: Thought content normal.         Cognition and Memory: Cognition and memory normal.         Judgment: Judgment normal.         PHQ-9 Score:       11/21/2022     2:23 PM 3/28/2024     1:00 PM 5/30/2024    12:00 PM   PHQ   PHQ-9 Total Score 9 3 5   Q9: Thoughts of better off dead/self-harm past 2 weeks Not at all Not at all Not at all       ABUNDIO-7 Score:      11/22/2022     8:00 AM 3/28/2024     1:00 PM 5/30/2024    12:00 PM   ABUNDIO-7 SCORE   Total Score 12 7 4       LABS (may not contain today's labs)                                                      Today's lab data  No results found for any visits on 07/26/24.      HISTORY                                                    Problem list reviewed & adjusted, as indicated.  Patient Active Problem List   Diagnosis    Methamphetamine use disorder, severe (H)    Tobacco use disorder, moderate, dependence    Chemical dependency (H)       MEDICATION LIST (prior to visit)  Current Outpatient Medications   Medication Sig Dispense Refill    naltrexone (DEPADE/REVIA) 50 MG tablet Take 1 tablet (50 mg) by mouth daily 60 tablet 0    acetaminophen (TYLENOL) 500 MG tablet Take 500-1,000 mg by mouth every 8 hours as needed for mild pain      benzocaine-menthol (CEPACOL) 15-3.6 MG lozenge Place 1 lozenge inside cheek every 2 hours as needed for sore throat      buPROPion (WELLBUTRIN XL) 300 MG 24 hr tablet Take 300 mg by mouth every morning      hydrOXYzine HCl (ATARAX) 50 MG tablet Take 1 tablet (50 mg) by mouth 3 times daily as needed for anxiety 30 tablet 0    ibuprofen (ADVIL/MOTRIN) 200 MG tablet Take 600 mg by mouth every 6 hours as needed for pain      loratadine (CLARITIN) 10  MG tablet Take 10 mg by mouth daily as needed for allergies      melatonin 5 MG tablet Take 5 mg by mouth nightly as needed for sleep      nicotine polacrilex (NICORETTE) 4 MG gum Place 1 each (4 mg) inside cheek as needed for nicotine withdrawal symptoms 220 each 1    senna-docusate (SENOKOT-S/PERICOLACE) 8.6-50 MG tablet Take 2 tablets by mouth daily as needed for constipation      venlafaxine (EFFEXOR XR) 75 MG 24 hr capsule Take 75 mg by mouth daily       No current facility-administered medications for this visit.     Facility-Administered Medications Ordered in Other Visits   Medication Dose Route Frequency Provider Last Rate Last Admin    Self Administer Medications: Behavioral Services   Does not apply See Admin Instructions Noa Burch MD           MEDICATION LIST (after visit)  Current Outpatient Medications   Medication Sig Dispense Refill    naltrexone (DEPADE/REVIA) 50 MG tablet Take 1 tablet (50 mg) by mouth daily 60 tablet 0    acetaminophen (TYLENOL) 500 MG tablet Take 500-1,000 mg by mouth every 8 hours as needed for mild pain      benzocaine-menthol (CEPACOL) 15-3.6 MG lozenge Place 1 lozenge inside cheek every 2 hours as needed for sore throat      buPROPion (WELLBUTRIN XL) 300 MG 24 hr tablet Take 300 mg by mouth every morning      hydrOXYzine HCl (ATARAX) 50 MG tablet Take 1 tablet (50 mg) by mouth 3 times daily as needed for anxiety 30 tablet 0    ibuprofen (ADVIL/MOTRIN) 200 MG tablet Take 600 mg by mouth every 6 hours as needed for pain      loratadine (CLARITIN) 10 MG tablet Take 10 mg by mouth daily as needed for allergies      melatonin 5 MG tablet Take 5 mg by mouth nightly as needed for sleep      nicotine polacrilex (NICORETTE) 4 MG gum Place 1 each (4 mg) inside cheek as needed for nicotine withdrawal symptoms 220 each 1    senna-docusate (SENOKOT-S/PERICOLACE) 8.6-50 MG tablet Take 2 tablets by mouth daily as needed for constipation      venlafaxine (EFFEXOR XR) 75 MG 24 hr  capsule Take 75 mg by mouth daily       No current facility-administered medications for this visit.     Facility-Administered Medications Ordered in Other Visits   Medication Dose Route Frequency Provider Last Rate Last Admin    Self Administer Medications: Behavioral Services   Does not apply See Admin Instructions Noa Burch MD         No Known Allergies    DANIELLE Huynh [facility]  [phone]

## 2024-08-29 NOTE — PROGRESS NOTES
Virtual Visit Details  Type of service:  Video Visit   Video Start Time:  8:36 AM  Video End Time:8:55 AM  Originating Location (pt. Location): Home  Distant Location (provider location):  On-site  Platform used for Video Visit: [employer] [facility]    A/P                                                    ASSESSMENT/PLAN  1. Methamphetamine use disorder, severe (H)  Stable, no recent use or cravings.   Trial of Naltrexone with adverse SE, will discontinue   Continue Wellbutrin 300 mg daily (managed per PCP)   Continue engagement with ongoing recovery supports and psychosocial interventions:   outpatient programming  [place] support groups/meetings   individual therapy     2. Tobacco use disorder, moderate, dependence  - quit smoking 7/22/24, acknowledged this accomplishment.   - Continue Wellbutrin 300 mg daily (prescribed by PCP)     3. Depression, unspecified depression type  Stable.   Continue all medications as prescribed per PCP. Wellbutrin  mg/day, and Venlafaxine  mg/day. Medication reviewed and updated.   - continue individual therapy       RTC  Return in about 3 months (around 11/30/2024) for Follow up, with me, using a video visit fi needed, welcome to follow up at anytime.      Counseled the patient on the importance of having a recovery program in addition to medication to manage recovery.  Components include avoiding isolating, having willingness to change, avoiding triggers and managing cravings. Encouraged having some type of sober network and practicing honesty with trusted support person(s). Encouraged other services such as counseling, 12 step or other self-help organizations.          Last encounter A/P 7/26/24  1. Methamphetamine use disorder, severe (H)  Overall well controlled. Intermittent cravings, discussed avoidance of environmental triggers.   Started Naltrexone today due to delay in filling at pharmacy. Will monitor for symptom improvement and SE.    Continue Wellbutrin 300 mg daily (prescribed by PCP)   Continue outpatient programming and meetings   Continue individual therapy   - reviewed urine drug screen from 7/22/24, negative for all substances   - naltrexone (DEPADE/REVIA) 50 MG tablet; Take 1 tablet (50 mg) by mouth daily  Dispense: 60 tablet; Refill: 0     2. Tobacco use disorder, moderate, dependence  - quit smoking 7/22/24, acknowledged this accomplishment.   - Continue Wellbutrin 300 mg daily (prescribed by PCP)      3. Depression, unspecified depression type  Stable.   Continue all medications as prescribed per PCP. Wellbutrin  mg/day, and Venlafaxine XR 75 mg/day.   - continue individual therapy       PDMP Review         Value Time User    State PDMP site checked  Yes 6/28/2024  8:53 AM Zeny Nicholas APRN CNP                SUBJECTIVE                                                      HPI: Jayna Davidson is a 39 year old female with history of depression, anxiety, PTSD, HTN, rotator cuff surgery, and methamphetamine use who presents for follow up.      Brief Hx:    Initial visit on 6/3/24, was in programming at [facility] at the time.  Admitted to  on 5/30/24. Reports last use as 5/28/24. H/o of daily methamphetamine use via inhalation since December 2023. Prior to that abstinence for 1 year. + cravings. Wellbutrin and Effexor resumed for anxiety and depression, established with PCP for long term management. Naltrexone + Wellbutrin started for StUD. Discharged from  and started outpatient programming in [place] at [facility]. Naltrexone not tolerated (nausea, fatigue, HA).      Substance Use History:   Most recent use, past or recent hx of harmful use  ALCOHOL - denies   CANNABIS - denies  PRESCRIPTION STIMULANTS - denies  COCAINE/CRACK - denies  METH/AMPHETAMINES - daily use, last use 5/28/24.   OPIATES - denies, other than prescribed   BENZODIAZEPINES - denies  KRATOM - denies  KETAMINE - denies  HALLUCINOGENS (including  DXM) - denies  BEHAVIORAL (Gambling, Eating d/o, Compulsivity) - denies  NICOTINE  Cigarettes: yes 1/2 pk per day   Chew/snus: denies   Vaping: occasional  Past NRT/medication use: yes         Previous withdrawal treatment episodes (e.g. detox): yes, 4/30/24 [place], stayed for 3 days for BP management   Previous [facility] treatment programs: 5 programs   Hospitalizations or overdose: denies   Medical complications from substance use: denies   IV Drug use?: denies   Previous Medication for Addiction Tx: denies   Longest period of full abstinence: 3.5 years  Activities that have previously supported abstinence: working   Current Recovery Activities: LP        Infectious disease screening  Hep C: negative per pt reports, declines screening on 6/3/24    HIV: negative per pt reports, declines screening on 6/3/24      Psychiatric History (per patient report and problem list review)  Past diagnoses - anxiety, depression, PTSD  Current or past psychiatrist: none, following with PCP   Current or past therapist:  Yes at [facility] in [place]   Hospitalizations/TMS/ECT - Denies   Suicide Attempts - denies   Medication trials - denies      SOCIAL HISTORY:  Housing status: with  and children   Employment status: previously working full time, was on medical leave with no pay   Relationship status:   Children: 2 who are 12 yo and 5 yo   Legal concerns related to use: probation   Contact information up to date- yes        Recent HPI Details:  Jul 26, 2024  - quit smoking on Monday!   - has had some triggers recently, trying to be very self aware. Overall cravings well controlled. Was recently back in [place], a place where she previously use to use. Was able to talk o her dad about this and reflect on environmental triggers.   - using coping skills   - almost 2 months remission from methamphetamine use.   - was not able to start Naltrexone because her prescription was actually previously filled at [place]  - outpatient  "treatment 3 days per week   - Celebrate Recovery on Tuesdays meeting   - focusing on self care   - positive impacts of sobriety on her relationship with children   - she did not have to take Valium for MRI, left shoulder full thickness tendon tear. Will have to have surgery.   - taking hydroxyzine only as needed   - continues Wellbutrin and venlafaxine unchanged     TODAY'S VISIT  HPI Aug 30, 2024  - tried taking Naltrexone took 25 mg and immediately caused nausea, fatigue, \"felt drunk\" described as headache and feeling sea sick.   - feels she has been doing well with meetings   - now 90 days remission from methamphetamine use   - working on forward thinking   - now 1 days per week for treatment   - started a new woman's group.   - not ready to go back to work yet  - [place] [employer] [place], her daughter is [place]   - going to gym everyday! Gradually able to run more, goal is to run 1 mile.   - quit smoking now for 7 weeks   - venlafaxine taking 150 mg per day   - just start marriage counseling     OBJECTIVE  PHYSICAL EXAM:  There were no vitals taken for this visit.    GENERAL: healthy, alert and no distress  EYES: Eyes grossly normal to inspection, PERRL and conjunctivae and sclerae normal  RESP: No respiratory distress  MENTAL STATUS EXAM  Appearance/Behavior: No appearant distress and Neatly groomed  Speech: Normal  Mood/Affect: normal affect  Insight: Adequate      PHQ-9 Score:       11/21/2022     2:23 PM 3/28/2024     1:00 PM 5/30/2024    12:00 PM   PHQ   PHQ-9 Total Score 9 3 5   Q9: Thoughts of better off dead/self-harm past 2 weeks Not at all Not at all Not at all       ABUNDIO-7 Score:      11/22/2022     8:00 AM 3/28/2024     1:00 PM 5/30/2024    12:00 PM   ABUNDIO-7 SCORE   Total Score 12 7 4       LABS (may not contain today's labs)                                                        Today's lab data  No results found for any visits on 08/30/24.        HISTORY                   "                                  Problem list reviewed & adjusted, as indicated.  Patient Active Problem List   Diagnosis    Methamphetamine use disorder, severe (H)    Tobacco use disorder, moderate, dependence    Chemical dependency (H)         MEDICATION LIST (prior to visit)  Current Outpatient Medications   Medication Sig Dispense Refill    acetaminophen (TYLENOL) 500 MG tablet Take 500-1,000 mg by mouth every 8 hours as needed for mild pain      benzocaine-menthol (CEPACOL) 15-3.6 MG lozenge Place 1 lozenge inside cheek every 2 hours as needed for sore throat      buPROPion (WELLBUTRIN XL) 300 MG 24 hr tablet Take 300 mg by mouth every morning      hydrOXYzine HCl (ATARAX) 50 MG tablet Take 1 tablet (50 mg) by mouth 3 times daily as needed for anxiety 30 tablet 0    ibuprofen (ADVIL/MOTRIN) 200 MG tablet Take 600 mg by mouth every 6 hours as needed for pain      loratadine (CLARITIN) 10 MG tablet Take 10 mg by mouth daily as needed for allergies      melatonin 5 MG tablet Take 5 mg by mouth nightly as needed for sleep      nicotine polacrilex (NICORETTE) 4 MG gum Place 1 each (4 mg) inside cheek as needed for nicotine withdrawal symptoms 220 each 1    senna-docusate (SENOKOT-S/PERICOLACE) 8.6-50 MG tablet Take 2 tablets by mouth daily as needed for constipation      venlafaxine (EFFEXOR XR) 75 MG 24 hr capsule Take 150 mg by mouth daily.       No current facility-administered medications for this visit.     Facility-Administered Medications Ordered in Other Visits   Medication Dose Route Frequency Provider Last Rate Last Admin    Self Administer Medications: Behavioral Services   Does not apply See Admin Instructions Noa Burch MD           MEDICATION LIST (after visit)  Current Outpatient Medications   Medication Sig Dispense Refill    acetaminophen (TYLENOL) 500 MG tablet Take 500-1,000 mg by mouth every 8 hours as needed for mild pain      benzocaine-menthol (CEPACOL) 15-3.6 MG lozenge Place 1 lozenge  inside cheek every 2 hours as needed for sore throat      buPROPion (WELLBUTRIN XL) 300 MG 24 hr tablet Take 300 mg by mouth every morning      hydrOXYzine HCl (ATARAX) 50 MG tablet Take 1 tablet (50 mg) by mouth 3 times daily as needed for anxiety 30 tablet 0    ibuprofen (ADVIL/MOTRIN) 200 MG tablet Take 600 mg by mouth every 6 hours as needed for pain      loratadine (CLARITIN) 10 MG tablet Take 10 mg by mouth daily as needed for allergies      melatonin 5 MG tablet Take 5 mg by mouth nightly as needed for sleep      nicotine polacrilex (NICORETTE) 4 MG gum Place 1 each (4 mg) inside cheek as needed for nicotine withdrawal symptoms 220 each 1    senna-docusate (SENOKOT-S/PERICOLACE) 8.6-50 MG tablet Take 2 tablets by mouth daily as needed for constipation      venlafaxine (EFFEXOR XR) 75 MG 24 hr capsule Take 150 mg by mouth daily.       No current facility-administered medications for this visit.     Facility-Administered Medications Ordered in Other Visits   Medication Dose Route Frequency Provider Last Rate Last Admin    Self Administer Medications: Behavioral Services   Does not apply See Admin Instructions Noa Burch MD             No Known Allergies      DANIELLE Huynh [facility] Addiction Medicine  [phone]

## 2024-08-30 ENCOUNTER — VIRTUAL VISIT (OUTPATIENT)
Dept: ADDICTION MEDICINE | Facility: CLINIC | Age: 39
End: 2024-08-30
Payer: COMMERCIAL

## 2024-08-30 DIAGNOSIS — F32.A DEPRESSION, UNSPECIFIED DEPRESSION TYPE: ICD-10-CM

## 2024-08-30 DIAGNOSIS — F15.20 METHAMPHETAMINE USE DISORDER, SEVERE (H): Primary | ICD-10-CM

## 2024-08-30 DIAGNOSIS — F17.200 TOBACCO USE DISORDER, MODERATE, DEPENDENCE: ICD-10-CM

## 2024-08-30 PROCEDURE — 99214 OFFICE O/P EST MOD 30 MIN: CPT | Mod: 95 | Performed by: NURSE PRACTITIONER

## 2024-08-30 ASSESSMENT — PAIN SCALES - GENERAL: PAINLEVEL: NO PAIN (0)

## 2024-08-30 NOTE — NURSING NOTE
Current patient location: [address] [place] [address] [address]  [facility] [address]    Is the patient currently in the state of [place]? YES    Visit mode:VIDEO    If the visit is dropped, the patient can be reconnected by: VIDEO VISIT: Text to cell phone:   Telephone Information:   Mobile [phone]    and VIDEO VISIT: Send to e-mail at: [email or web address]    Will anyone else be joining the visit? NO  (If patient encounters technical issues they should call [phone])    How would you like to obtain your AVS? MyChart    Are changes needed to the allergy or medication list? No    Are refills needed on medications prescribed by this physician? NO    Rooming Documentation:  Questionnaire(s) completed      Reason for visit: RECHECK    Bee LYNN

## 2024-10-31 ENCOUNTER — TELEPHONE (OUTPATIENT)
Dept: ADDICTION MEDICINE | Facility: CLINIC | Age: 39
End: 2024-10-31
Payer: COMMERCIAL

## 2024-10-31 NOTE — TELEPHONE ENCOUNTER
Patient called wanting to schedule virtual appt with Pasha as patient is currently in [place]. Patient relapsed and needs meds so she doesnt use. requesting a call back from ROBERT Nicholas and her appt is booked for 11/8 at 830 virtual.

## 2024-10-31 NOTE — TELEPHONE ENCOUNTER
I don't see anything scheduled with Zeny. Please ensure she has appointment scheduled.  Given naltrexone was discontinued due to side effects, I would recommend she discuss this in a visit.     Thanks.    Lexis Miller, DO on 10/31/2024 at 3:08 PM

## 2024-10-31 NOTE — TELEPHONE ENCOUNTER
Patient set up appt on 11/7/24 with Zeny. Last time she saw Zeny she wasn't having any cravings. She has more stress right now with her kids and she did have a relapse of meth. She is going to meetings and doing what she is supposed to do now.     She would like to go back on the naltrexone. She said Zeny discontinued it at last visit because it made her sick after 1 pill but she didn't give it another try. She really would like a little extra help with the cravings until her appt next week.     Jennifer Mir RN on 10/31/2024 at 2:45 PM

## 2024-11-01 NOTE — TELEPHONE ENCOUNTER
Reviewed will address medication management options at follow up.     DANIELLE Huynh CNP on 11/1/2024 at 10:08 AM

## 2024-11-07 ENCOUNTER — VIRTUAL VISIT (OUTPATIENT)
Dept: ADDICTION MEDICINE | Facility: CLINIC | Age: 39
End: 2024-11-07
Payer: COMMERCIAL

## 2024-11-07 DIAGNOSIS — F15.20 METHAMPHETAMINE USE DISORDER, SEVERE (H): Primary | ICD-10-CM

## 2024-11-07 RX ORDER — NALTREXONE HYDROCHLORIDE 50 MG/1
TABLET, FILM COATED ORAL
Qty: 30 TABLET | Refills: 0 | Status: SHIPPED | OUTPATIENT
Start: 2024-11-07

## 2024-11-07 ASSESSMENT — PAIN SCALES - GENERAL: PAINLEVEL_OUTOF10: NO PAIN (0)

## 2024-11-07 NOTE — PROGRESS NOTES
Virtual Visit Details    Type of service:  Telephone  Video Start Time:  8:40 AM   Video End Time:9:00 AM  Originating Location (pt. Location): Home  Distant Location (provider location):  On-site           [facility]    A/P                                                    ASSESSMENT/PLAN  Diagnoses and all orders for this visit:  Methamphetamine use disorder, severe (H)  -     naltrexone (DEPADE/REVIA) 50 MG tablet; Take 25 mg daily x 5 days, then increase to 50 mg daily.    Orders Placed This Encounter   Medications    naltrexone (DEPADE/REVIA) 50 MG tablet     Sig: Take 25 mg daily x 5 days, then increase to 50 mg daily.     Dispense:  30 tablet     Refill:  0     Nov 7, 2024  - Reports return to methamphetamine use and persistent cravings. Confirms date of last use ~ 1 week ago. Would like to resume Naltrexone. Previously experienced adverse SE including headache, nausea, and fatigue. Discussed starting dose of 25 mg x 5 days and then titrate to 50 mg daily if tolerated. Reviewed MOA and common SE. Recommendation to take in the evening or at HS to minimize adverse SE. Consider transition to Vivitrol at follow up. Continue Bupropion 300 mg daily as prescribed per PCP.   - Discussed if Naltrexone not tolerated could consider alternative pharmacotherapy mirtazapine. Pt preference to trial Naltrexone + Bupropion at this time.   - urine drug screen results reviewed from 10/17/24, positive for amphetamine and methamphetamine   - POC UDS at follow up, in person appointment   - continue outpatient programming   - continue mutual support groups   - continue individual therapy   - additional patient counseling as below.        Continued Complex Management  The longitudinal plan of care for Stimulant Use Disorder was addressed during this visit. Due to the added complexity in care, I will continue to support Jayna in the subsequent management and with ongoing continuity of care.      Last  encounter A/P 8/30/24  1. Methamphetamine use disorder, severe (H)  Stable, no recent use or cravings.   Trial of Naltrexone with adverse SE, will discontinue   Continue Wellbutrin 300 mg daily (managed per PCP)   Continue engagement with ongoing recovery supports and psychosocial interventions:   outpatient programming  [place] support groups/meetings   individual therapy      2. Tobacco use disorder, moderate, dependence  - quit smoking 7/22/24, acknowledged this accomplishment.   - Continue Wellbutrin 300 mg daily (prescribed by PCP)      3. Depression, unspecified depression type  Stable.   Continue all medications as prescribed per PCP. Wellbutrin  mg/day, and Venlafaxine  mg/day. Medication reviewed and updated.   - continue individual therapy       PDMP Review         Value Time User    State PDMP site checked  Yes 11/7/2024  8:29 AM Zeny Nicholas APRN CNP            RTC  Return in about 1 week (around 11/14/2024) for Follow up, with me, in person.      Counseled the patient on the importance of having a recovery program in addition to medication to manage recovery.  Components include avoiding isolating, having willingness to change, avoiding triggers and managing cravings. Encouraged having some type of sober network and practicing honesty with trusted support person(s). Encouraged other services such as counseling, 12 step or other self-help organizations.        SUBJECTIVE                                                      HPI: Jayna Davidson is a 39 year old female with history of depression, anxiety, PTSD, HTN, rotator cuff surgery, and methamphetamine use who presents for follow up.      Brief Hx:    Initial visit on 6/3/24, was in programming at [facility] at the time.  Admitted to [facility] on 5/30/24. Reports last use as 5/28/24. H/o of daily methamphetamine use via inhalation since December 2023. Prior to that abstinence for 1 year. + cravings. Wellbutrin and Effexor resumed for  anxiety and depression, established with PCP for long term management. Naltrexone + Wellbutrin started for StUD. Discharged from  and started outpatient programming in [place] at [facility]. Naltrexone not tolerated (nausea, fatigue, HA). Brief return to methamphetamine use after period of remission x 3 months. Last methamphetamine use 10/31/24. Resumed Naltrexone + Bupropion.      Substance Use History:   Most recent use, past or recent hx of harmful use  ALCOHOL - denies   CANNABIS - denies  PRESCRIPTION STIMULANTS - denies  COCAINE/CRACK - denies  METH/AMPHETAMINES - daily use, 10/31/24  OPIATES - denies, other than prescribed   BENZODIAZEPINES - denies  KRATOM - denies  KETAMINE - denies  HALLUCINOGENS (including DXM) - denies  BEHAVIORAL (Gambling, Eating d/o, Compulsivity) - denies  NICOTINE  Cigarettes: yes 1/2 pk per day   Chew/snus: denies   Vaping: occasional  Past NRT/medication use: yes         Previous withdrawal treatment episodes (e.g. detox): yes, 4/30/24 [facility], stayed for 3 days for BP management   Previous [facility] treatment programs: 5 programs   Hospitalizations or overdose: denies   Medical complications from substance use: denies   IV Drug use?: denies   Previous Medication for Addiction Tx: denies   Longest period of full abstinence: 3.5 years  Activities that have previously supported abstinence: working   Current Recovery Activities:         Infectious disease screening  Hep C: negative per pt reports, declines screening on 6/3/24    HIV: negative per pt reports, declines screening on 6/3/24      Psychiatric History (per patient report and problem list review)  Past diagnoses - anxiety, depression, PTSD  Current or past psychiatrist: none, following with PCP   Current or past therapist:  Yes at [facility] in [place]   Hospitalizations/TMS/ECT - Denies   Suicide Attempts - denies   Medication trials - denies      SOCIAL HISTORY:  Housing status: with  and children   Employment status:  "previously working full time, was on medical leave with no pay   Relationship status:   Children: 2 who are 14 yo and 3 yo   Legal concerns related to use: probation   Contact information up to date- yes     Recent HPI Details:  Aug 30, 2024  - tried taking Naltrexone took 25 mg and immediately caused nausea, fatigue, \"felt drunk\" described as headache and feeling sea sick.   - feels she has been doing well with meetings   - now 90 days remission from methamphetamine use   - working on forward thinking   - now 1 days per week for treatment   - started a new woman's group.   - not ready to go back to work yet  - joined [employer] [place], her daughter is [place].   - going to gym everyday! Gradually able to run more, goal is to run 1 mile.   - quit smoking now for 7 weeks   - venlafaxine taking 150 mg per day   - just start marriage counseling     TODAY'S VISIT  HPI Nov 7, 2024  - reports that since her last visit she returned to methamphetamine use. Has used 3 times over the past 3 weeks. She told her  and she is back on color wheel. She also told her counselor at treatment. Having to complete random urine drug screens helps to hold her accountable.   - she is going back to treatment 3 times per week (previously once per week)   - felt the stressors in her life were a trigger to return to use.   - trying to surround herself with support, her friend moved in with her to help   - \"trying to use all my tools, but I feel like I am white knuckling it\"   - she is very motivated to trial naltrexone again.   - last use was 1 week ago.     OBJECTIVE  PHYSICAL EXAM:  There were no vitals taken for this visit.    Physical Exam  Constitutional:       General: She is in acute distress.   Pulmonary:      Effort: Pulmonary effort is normal.   Neurological:      Mental Status: She is alert and oriented to person, place, and time.   Psychiatric:         Mood and Affect: Mood normal.  "        Speech: Speech normal.         Cognition and Memory: Cognition and memory normal.         Judgment: Judgment normal.      Comments: Insight and judgment fair          PHQ-9 Score:       11/21/2022     2:23 PM 3/28/2024     1:00 PM 5/30/2024    12:00 PM   PHQ   PHQ-9 Total Score 9 3 5   Q9: Thoughts of better off dead/self-harm past 2 weeks Not at all  Not at all Not at all       Patient-reported       ABUNDIO-7 Score:      11/22/2022     8:00 AM 3/28/2024     1:00 PM 5/30/2024    12:00 PM   ABUNDIO-7 SCORE   Total Score 12 7 4       LABS (may not contain today's labs)                                                      Today's lab data  No results found for any visits on 11/07/24.        HISTORY                                                    Problem list reviewed & adjusted, as indicated.  Patient Active Problem List   Diagnosis    Methamphetamine use disorder, severe (H)    Tobacco use disorder, moderate, dependence    Chemical dependency (H)         MEDICATION LIST (prior to visit)  Current Outpatient Medications   Medication Sig Dispense Refill    naltrexone (DEPADE/REVIA) 50 MG tablet Take 25 mg daily x 5 days, then increase to 50 mg daily. 30 tablet 0    acetaminophen (TYLENOL) 500 MG tablet Take 500-1,000 mg by mouth every 8 hours as needed for mild pain      benzocaine-menthol (CEPACOL) 15-3.6 MG lozenge Place 1 lozenge inside cheek every 2 hours as needed for sore throat      buPROPion (WELLBUTRIN XL) 300 MG 24 hr tablet Take 300 mg by mouth every morning      hydrOXYzine HCl (ATARAX) 50 MG tablet Take 1 tablet (50 mg) by mouth 3 times daily as needed for anxiety 30 tablet 0    ibuprofen (ADVIL/MOTRIN) 200 MG tablet Take 600 mg by mouth every 6 hours as needed for pain      loratadine (CLARITIN) 10 MG tablet Take 10 mg by mouth daily as needed for allergies      melatonin 5 MG tablet Take 5 mg by mouth nightly as needed for sleep      nicotine polacrilex (NICORETTE) 4 MG gum Place 1 each (4 mg) inside  cheek as needed for nicotine withdrawal symptoms 220 each 1    senna-docusate (SENOKOT-S/PERICOLACE) 8.6-50 MG tablet Take 2 tablets by mouth daily as needed for constipation      venlafaxine (EFFEXOR XR) 75 MG 24 hr capsule Take 150 mg by mouth daily.       No current facility-administered medications for this visit.     Facility-Administered Medications Ordered in Other Visits   Medication Dose Route Frequency Provider Last Rate Last Admin    Self Administer Medications: Behavioral Services   Does not apply See Admin Instructions Noa Burch MD           MEDICATION LIST (after visit)  Current Outpatient Medications   Medication Sig Dispense Refill    naltrexone (DEPADE/REVIA) 50 MG tablet Take 25 mg daily x 5 days, then increase to 50 mg daily. 30 tablet 0    acetaminophen (TYLENOL) 500 MG tablet Take 500-1,000 mg by mouth every 8 hours as needed for mild pain      benzocaine-menthol (CEPACOL) 15-3.6 MG lozenge Place 1 lozenge inside cheek every 2 hours as needed for sore throat      buPROPion (WELLBUTRIN XL) 300 MG 24 hr tablet Take 300 mg by mouth every morning      hydrOXYzine HCl (ATARAX) 50 MG tablet Take 1 tablet (50 mg) by mouth 3 times daily as needed for anxiety 30 tablet 0    ibuprofen (ADVIL/MOTRIN) 200 MG tablet Take 600 mg by mouth every 6 hours as needed for pain      loratadine (CLARITIN) 10 MG tablet Take 10 mg by mouth daily as needed for allergies      melatonin 5 MG tablet Take 5 mg by mouth nightly as needed for sleep      nicotine polacrilex (NICORETTE) 4 MG gum Place 1 each (4 mg) inside cheek as needed for nicotine withdrawal symptoms 220 each 1    senna-docusate (SENOKOT-S/PERICOLACE) 8.6-50 MG tablet Take 2 tablets by mouth daily as needed for constipation      venlafaxine (EFFEXOR XR) 75 MG 24 hr capsule Take 150 mg by mouth daily.       No current facility-administered medications for this visit.     Facility-Administered Medications Ordered in Other Visits   Medication Dose Route  Frequency Provider Last Rate Last Admin    Self Administer Medications: Behavioral Services   Does not apply See Admin Instructions Noa Burch MD             No Known Allergies    I sent a total of 30 minutes today, on the care of this patient. This consisted of face-to-face time as well as time spent on pre-visit and post-visit activities including chart review, results review, and documentation.     DANIELLE Huynh [facility]  [phone]

## 2024-11-07 NOTE — NURSING NOTE
Current patient location: [address] [place] [address] [address]  [facility] [id number]    Is the patient currently in the state of [place]? YES    Visit mode:VIDEO    If the visit is dropped, the patient can be reconnected by: VIDEO VISIT: Text to cell phone:   Telephone Information:   Mobile [phone]    and VIDEO VISIT: Send to e-mail at: [email or web address]    Will anyone else be joining the visit? NO  (If patient encounters technical issues they should call [phone]    Are changes needed to the allergy or medication list? No    Are refills needed on medications prescribed by this physician? NO    Rooming Documentation:  Questionnaire(s) completed    Reason for visit: RECHECK    Bee LYNN

## 2025-04-12 ENCOUNTER — HEALTH MAINTENANCE LETTER (OUTPATIENT)
Age: 40
End: 2025-04-12